# Patient Record
Sex: FEMALE | Race: WHITE | NOT HISPANIC OR LATINO | Employment: UNEMPLOYED | URBAN - METROPOLITAN AREA
[De-identification: names, ages, dates, MRNs, and addresses within clinical notes are randomized per-mention and may not be internally consistent; named-entity substitution may affect disease eponyms.]

---

## 2024-01-17 ENCOUNTER — OFFICE VISIT (OUTPATIENT)
Dept: OTOLARYNGOLOGY | Facility: CLINIC | Age: 39
End: 2024-01-17
Payer: COMMERCIAL

## 2024-01-17 VITALS — WEIGHT: 211 LBS | BODY MASS INDEX: 33.12 KG/M2 | HEIGHT: 67 IN

## 2024-01-17 DIAGNOSIS — J35.8 TONSILLOLITH: ICD-10-CM

## 2024-01-17 DIAGNOSIS — J35.01 CHRONIC TONSILLITIS: Primary | ICD-10-CM

## 2024-01-17 DIAGNOSIS — J35.1 TONSILLAR HYPERTROPHY: ICD-10-CM

## 2024-01-17 PROCEDURE — 99203 OFFICE O/P NEW LOW 30 MIN: CPT | Performed by: OTOLARYNGOLOGY

## 2024-01-17 PROCEDURE — 1036F TOBACCO NON-USER: CPT | Performed by: OTOLARYNGOLOGY

## 2024-01-17 RX ORDER — BISMUTH SUBSALICYLATE 262 MG
1 TABLET,CHEWABLE ORAL DAILY
COMMUNITY

## 2024-01-17 RX ORDER — ROSUVASTATIN CALCIUM 20 MG/1
20 TABLET, COATED ORAL DAILY
COMMUNITY

## 2024-01-17 RX ORDER — ESCITALOPRAM OXALATE 20 MG/1
20 TABLET ORAL DAILY
COMMUNITY

## 2024-01-17 RX ORDER — CLONAZEPAM 0.5 MG/1
0.5 TABLET ORAL 2 TIMES DAILY
COMMUNITY

## 2024-01-17 NOTE — PROGRESS NOTES
HPI  Deonna Saleem is a 38 y.o. female many year history of tonsil stones.  She has tried a variety of conservative methods including Waterpik, hydrogen peroxide gargle, manual disimpaction.  They continue to bother her with halitosis and pain and her efforts to remove them off and leave her with pain.  We discussed tonsillectomy about 6 years ago and she has considered this in the past but her symptoms have only gotten worse since that time.  She is otherwise healthy.  No bleeding or anesthesia problems.      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    Localized edema     Edema of leg    Other specified postprocedural states 12/06/2019    Status post LASIK surgery    Other vitreous opacities, unspecified eye 12/06/2019    Vitreous floaters    Personal history of other diseases of the nervous system and sense organs     History of sleep apnea    Personal history of other endocrine, nutritional and metabolic disease     History of diabetes mellitus    Personal history of other endocrine, nutritional and metabolic disease     History of obesity    Personal history of other endocrine, nutritional and metabolic disease     History of high cholesterol    Personal history of other endocrine, nutritional and metabolic disease     History of Hashimoto thyroiditis    Personal history of other mental and behavioral disorders     History of panic attacks    Personal history of other mental and behavioral disorders 11/17/2013    History of depression    Personal history of other mental and behavioral disorders     History of depression    Personal history of other specified conditions 08/08/2019    History of palpitations    Personal history of urinary calculi     History of kidney stones    Unspecified disorder of refraction 12/06/2019    Refractive error            Medications:     Current  "Outpatient Medications:     clonazePAM (KlonoPIN) 0.5 mg tablet, Take 1 tablet (0.5 mg) by mouth 2 times a day., Disp: , Rfl:     escitalopram (Lexapro) 20 mg tablet, Take 1 tablet (20 mg) by mouth once daily., Disp: , Rfl:     multivitamin tablet, Take 1 tablet by mouth once daily., Disp: , Rfl:     rosuvastatin (Crestor) 20 mg tablet, Take 1 tablet (20 mg) by mouth once daily., Disp: , Rfl:      Allergies:  Allergies   Allergen Reactions    Gadolinium-Containing Contrast Media Other        Physical Exam:  Last Recorded Vitals  Height 1.702 m (5' 7\"), weight 95.7 kg (211 lb).  General:     General appearance: Well-developed, well-nourished in no acute distress.       Voice:  normal       Head/face: Normal appearance; nontender to palpation     Facial nerve function: Normal and symmetric bilaterally.    Oral/oropharynx:     Oral vestibule: Normal labial and gingival mucosa     Tongue/floor of mouth: Normal without lesion     Oropharynx: Clear.  No lesions present of the hard/soft palate, posterior pharynx.  2+ tonsils, deep crypts    Neck:     Neck: Normal appearance, trachea midline     Salivary glands: Normal to palpation bilaterally     Lymph nodes: No cervical lymphadenopathy to palpation     Thyroid: No thyromegaly.  No palpable nodules     Range of motion: Normal    Neurological:     Cortical functions: Alert and oriented x3, appropriate affect       Larynx/hypopharynx:     Laryngeal findings: Mirror exam inadequate or limited secondary to enlarged base of tongue and/or excessive gagging    Ear:     Ear canal: Normal bilaterally     Tympanic membrane: Intact and mobile bilaterally     Pinna: Normal bilaterally     Hearing:  Gross hearing assessment normal by voice    Nose:     Visualized using: Anterior rhinoscopy     Nasopharynx: Inadequate mirror exam secondary to gag, anatomy.       Nasal dorsum: Nontraumatic midline appearance     Septum: Midline     Inferior turbinates: Normally sized     Mucosa: " Bilateral, pink, normal appearing       Assessment/Plan   Having failed conservative treatment, I have recommended tonsillectomy.  The risks, goals, and alternatives were discussed in detail including, but not limited to, general anesthesia, dehydration, bleeding, and infection.  Velopharyngeal insufficiency and regrowth of tissue potentially requiring revision procedure in the future were also reviewed.  Informed consent was indicated and the procedure will be scheduled.         Kwesi Hicks MD

## 2024-01-24 ENCOUNTER — APPOINTMENT (OUTPATIENT)
Dept: OTOLARYNGOLOGY | Facility: CLINIC | Age: 39
End: 2024-01-24
Payer: COMMERCIAL

## 2024-02-09 ENCOUNTER — APPOINTMENT (OUTPATIENT)
Dept: SURGERY | Facility: CLINIC | Age: 39
End: 2024-02-09
Payer: COMMERCIAL

## 2024-02-12 ENCOUNTER — APPOINTMENT (OUTPATIENT)
Dept: PHYSICAL THERAPY | Facility: CLINIC | Age: 39
End: 2024-02-12
Payer: COMMERCIAL

## 2024-02-13 ENCOUNTER — APPOINTMENT (OUTPATIENT)
Dept: OBSTETRICS AND GYNECOLOGY | Facility: CLINIC | Age: 39
End: 2024-02-13
Payer: COMMERCIAL

## 2024-02-21 ENCOUNTER — APPOINTMENT (OUTPATIENT)
Dept: OTOLARYNGOLOGY | Facility: CLINIC | Age: 39
End: 2024-02-21
Payer: COMMERCIAL

## 2024-02-23 ENCOUNTER — TELEPHONE (OUTPATIENT)
Dept: OTOLARYNGOLOGY | Facility: CLINIC | Age: 39
End: 2024-02-23
Payer: COMMERCIAL

## 2024-02-23 NOTE — TELEPHONE ENCOUNTER
Deonna is scheduled for tonsillectomy 4/11/2024. She also needs to schedule a hysterectomy and would like to schedule that procedure for 5/10/2024. They wanted check with you if you think that will be ok?

## 2024-03-07 ENCOUNTER — APPOINTMENT (OUTPATIENT)
Dept: PHYSICAL THERAPY | Facility: CLINIC | Age: 39
End: 2024-03-07
Payer: COMMERCIAL

## 2024-03-08 ENCOUNTER — TREATMENT (OUTPATIENT)
Dept: PHYSICAL THERAPY | Facility: CLINIC | Age: 39
End: 2024-03-08
Payer: COMMERCIAL

## 2024-03-08 DIAGNOSIS — R10.2 PELVIC PAIN: Primary | ICD-10-CM

## 2024-03-08 DIAGNOSIS — Z15.09 LYNCH SYNDROME: ICD-10-CM

## 2024-03-08 PROCEDURE — 97162 PT EVAL MOD COMPLEX 30 MIN: CPT | Mod: GP | Performed by: PHYSICAL THERAPIST

## 2024-03-08 PROCEDURE — 97112 NEUROMUSCULAR REEDUCATION: CPT | Mod: GP | Performed by: PHYSICAL THERAPIST

## 2024-03-08 PROCEDURE — 97530 THERAPEUTIC ACTIVITIES: CPT | Mod: GP | Performed by: PHYSICAL THERAPIST

## 2024-03-08 ASSESSMENT — PAIN SCALES - GENERAL: PAINLEVEL_OUTOF10: 6

## 2024-03-08 ASSESSMENT — PAIN - FUNCTIONAL ASSESSMENT: PAIN_FUNCTIONAL_ASSESSMENT: 0-10

## 2024-03-08 ASSESSMENT — PAIN DESCRIPTION - DESCRIPTORS: DESCRIPTORS: ACHING

## 2024-03-08 NOTE — PROGRESS NOTES
Physical Therapy Pelvic Floor Evaluation    Patient Name: Deonna Saleem  MRN: [id number]  Today's Date: 3/9/2024  Time Calculation  Start Time: 1020  Stop Time: 1135  Time Calculation (min): 75 min  PT Therapeutic Procedures Time Entry  Neuromuscular Re-Education Time Entry: 10  Therapeutic Activity Time Entry: 15     Moderate complexity due to patient's clinical presentation being evolving with changing characteristics, with comorbidities, all of which may negatively impact rehab tolerance and progression.    Current Problem:   1. 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  Has a lot of pain in groin and hip that has been going on for years. Tried therapy in the past a felt some relief.   Has tried PFPT in the past which helped.  Has recently seen dr about incomplete emptying of bowels and find she Keeps wiping.    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  Patient Primary Goal:   To reduce pain   PELVIC PAIN:     Location: left groin area   Description: constant 2-3 but can increase with standing and strenuous activity, worse at night 6-7/10 -- aching  Aggravating factors:ADLS and evening   Since onset, the symptoms are: unchanging (has had MRI's and numerous other injections to SIJ, hip without benefit)  Yes pain with intercourse    MENSTRUAL HISTORY:  Currently period is regular, really heavy  for a day then lighter    OB HISTORY:  1 child of 5 years old -- vaginal delivery -- no tearing    BLADDER:     No reports of bladder incontinence (stress or urgenecy), frequency    BOWEL:     Frequent constipation/straining/incomplete emptying: tends to be more constipated, takes benefiber and feels like she keeps wiping after bowel movement    FLUID/DIET INTAKE: 2L of water, benefiber, fiber one cereal    EXERCISE:  Current exercise regime:   Just started walking on treadmill    Objective   POSTURE/ALIGNMENT:  Increase in lumbar lordosis, slight thoracic scoliosis convex right, + right anterior rotation of innominate      ROM:  Lumbar ROM Range   Flexion 50%   Extension 50%   R sidebend 50%   L sidebend 50%     Hip AROM L R   Overall normal ROM grossly X X      MMT:  Hip MMT L R   Hip Flexion 4/5 4/5   Hip Extension 4/5 4/5   Hip Abduction 4-/5 4/5   Hip Adduction 4-/5 4/5   External Rotation 4-/5 4/5   Internal Rotation 4-/5 4/5      TA: fair transverse abdominis activation, poor load transfer with active SLR    FLEXIBILITY R/L:  Hamstrings: Tight/tight  DORINA: negative  FADIR: Negative    EXTERNAL MUSCLE PALPATION:  Iliopsoas: tender on left along with TFL on left   Adductor: tightness bilaterally     PELVIC FLOOR  Patient Position:  hooklying  EXTERNAL OBSERVATION:  Voluntary Contraction: able to contract and notice pucker of anus    Voluntary Relaxation: able to relax with proper cueing  Vulvar Assessment: healthy tissue noted    EXTERNAL PALPATION:  Levator Ani: no pain with    Bulbo: no pain with    Ischiocavernosus: no pain with    Transverse perineum: no pain with    Tender also at pubis     INTERNAL VAGINAL EXAMINATION WITH PATIENT CONSENT:  Patient position:  Hooklying   Internal:  Superficial layer mm   Vaginal opening slight tighter on right    Deep layer:  Tenderness at obturator internus bilateral, iliococcygeus and pubococcygeus Bilateral    Strength  3/5, endurance 4 sec     Prolapse  No noted  prolapse    Outcome Measures:  PFIQ-7:  22    Treatments:  Therapeutic activity:    Discussed elimination position with squatty potty and use of hip abd/add to increase tightness of anal sphincter to assist in elimination of excessive wiping  Neuromuscular re-education:    Educated on breathing and importance of diaphragmatic 360 breathing in relation to pelvic floor   Discussed kegel with coordinated breathing (handouts issued)     OP EDUCATION:  Outpatient Education  Individual(s) Educated: Patient  Education Provided: Anatomy, Home Exercise Program, POC, Physiology  Risk and Benefits Discussed with Patient/Caregiver/Other: yes  Patient/Caregiver Demonstrated Understanding: yes  Plan of Care Discussed and Agreed Upon: yes  Patient Response to Education: Patient/Caregiver Verbalized Understanding of Information, Patient/Caregiver Performed Return Demonstration of Exercises/Activities, Patient/Caregiver Asked Appropriate Questions    Assessment   PT Assessment Results: Decreased strength, Decreased range of motion, Impaired balance, Decreased mobility, Decreased coordination, Pain  Rehab Prognosis: Good  Evaluation/Treatment Tolerance: Patient tolerated treatment well    Pt is a 38 y.o. female who presents with impairments of weakness, tightness and pain. These impairments have led to functional limitations including difficulty with sleeping, increase wiping after bowel movement and pain with intercourse. Pt would benefit from skilled physical therapy intervention to improve above impairments and facilitate return to function.    Plan:  Treatment/Interventions: Aquatic therapy, Dry needling, Education/ Instruction, Electrical stimulation, Manual therapy, Therapeutic exercises, Therapeutic activities, Ultrasound, Taping techniques, Neuromuscular re-education  PT Plan: Skilled PT  PT Frequency: 1 time per week  Duration: 10 sessions in 12 weeks (patient having hysterectomy in 8 weeks... paitent will be scheduled up  until surgery)  Number of Treatments Authorized: 60 PT/OT/ST  Rehab Potential: Good  Plan of Care Agreement: Patient  Next session:  continued with stretching and strengthening   Goals:  Active       PT Problem       STGs 5 sessions       Start:  03/08/24    Expected End:  04/23/24       1)  Patient will be knowledgeable with regards to downtraining techniques to improve relaxation of pelvic floor muscles  2)  Patient will report 25% less pain/symptoms during ADLs  3)  Patient will perform diaphragmatic breathing properly to relax and contract pelvic floor muscle appropriately  4)  Patient will demonstrate good transverse abdominis activation to stabilize lumbopelvic girdle during ADLs                        LTGs -- 10 sessions       Start:  03/08/24    Expected End:  06/06/24       1)  Patient will increase tolerance to internal penetration for examination with min to nil pain  2)  Patient will be able to return to prior level of function with 75% less pain in evening  3)  Patient will have = pelvic alignment to improve posture of lumbar/sacral spine  4)  Patient will improve pelvic floor contraction to by 1/2-1 MMT  with coordinated exhale for improved continence   5)  Patient will increase LE strength by 1/2-1 MMT to support and stabilize lumbopelvic region         Patient Stated Goal 1       Start:  03/08/24    Expected End:  06/06/24       Patient wants to lessen pain and prevent problems for upcoming hysterectomy            Above Evaluation sent to Francheska Kenyon MD at [facility] via facility fax.

## 2024-03-08 NOTE — Clinical Note
March 9, 2024      No Recipients    Patient: Deonna Saleem   YOB: 1985   Date of Visit: 3/8/2024       Dear No referring provider defined for this encounter.    The attached plan of care is being sent to you because your patient’s medical reimbursement requires that you certify the plan of care. Your signature is required to allow uninterrupted insurance coverage.      You may indicate your approval by signing below and faxing this form back to us at No information on file..    Please call No information on file. with any questions or concerns.    Thank you for this referral,        Domenica Olson, PT  [facility] [address] [place] [address]    Payer: Payor: GAYE / Plan: ANTHEM [employer]MP / Product Type: *No Product type* /                                                                         Date:     Dear Domenica Olson PT,     Re: Ms. Deonna Saleem, MRN:[id number]    I certify that I have reviewed the attached plan of care and it is medically necessary for Ms. Deonna Saleem (1985) who is under my care.          ______________________________________                    _________________  Provider name and credentials                                           Date and time                                                                                      The following plan is in draft form.  Please refer to the current version for the most up-to-date information.            Plan of Care 3/8/24   Effective from: 3/8/2024  Effective to: 6/6/2024    Draft  Plan ID: [id number]           Participants as of 3/9/2024    Name Type Comments Contact Info    Domenica Olson PT Physical Therapist  [phone]      Last Plan Note     Author: Domenica Olson PT Status: Sign when Signing Visit Last edited: 3/8/2024 10:15 AM           Physical Therapy Pelvic Floor Evaluation    Patient Name: Deonna aSleem  MRN: [id number]  Today's Date: 3/9/2024  Time  Calculation  Start Time: 1020  Stop Time: 1135  Time Calculation (min): 75 min  PT Therapeutic Procedures Time Entry  Neuromuscular Re-Education Time Entry: 10  Therapeutic Activity Time Entry: 15     Moderate complexity due to patient's clinical presentation being evolving with changing characteristics, with comorbidities, all of which may negatively impact rehab tolerance and progression.    Current Problem:   1. 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  Has a lot of pain in groin and hip that has been going on for years. Tried therapy in the past a felt some relief.   Has tried PFPT in the past which helped.  Has recently seen dr about incomplete emptying of bowels and find she Keeps wiping.    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  Patient Primary Goal:   To reduce pain   PELVIC PAIN:     Location: left groin area   Description: constant 2-3 but can increase with standing and strenuous activity, worse at night 6-7/10 -- aching  Aggravating factors:ADLS and evening   Since onset, the symptoms are: unchanging (has had MRI's and numerous other injections to SIJ, hip without benefit)  Yes pain with intercourse    MENSTRUAL HISTORY:  Currently period is regular, really heavy for a day then lighter    OB HISTORY:  1 child of 5 years old -- vaginal delivery -- no tearing    BLADDER:     No  reports of bladder incontinence (stress or urgenecy), frequency    BOWEL:     Frequent constipation/straining/incomplete emptying: tends to be more constipated, takes benefiber and feels like she keeps wiping after bowel movement    FLUID/DIET INTAKE: 2L of water, benefiber, fiber one cereal    EXERCISE:  Current exercise regime:   Just started walking on treadmill    Objective   POSTURE/ALIGNMENT:  Increase in lumbar lordosis, slight thoracic scoliosis convex right, + right anterior rotation of innominate      ROM:  Lumbar ROM Range   Flexion 50%   Extension 50%   R sidebend 50%   L sidebend 50%     Hip AROM L R   Overall normal ROM grossly X X      MMT:  Hip MMT L R   Hip Flexion 4/5 4/5   Hip Extension 4/5 4/5   Hip Abduction 4-/5 4/5   Hip Adduction 4-/5 4/5   External Rotation 4-/5 4/5   Internal Rotation 4-/5 4/5      TA: fair transverse abdominis activation, poor load transfer with active SLR    FLEXIBILITY R/L:  Hamstrings: Tight/tight  DORINA: negative  FADIR: Negative    EXTERNAL MUSCLE PALPATION:  Iliopsoas: tender on left along with TFL on left   Adductor: tightness bilaterally     PELVIC FLOOR  Patient Position:  hooklying  EXTERNAL OBSERVATION:  Voluntary Contraction: able to contract and notice pucker of anus    Voluntary Relaxation: able to relax with proper cueing  Vulvar Assessment: healthy tissue noted    EXTERNAL PALPATION:  Levator Ani: no pain with    Bulbo: no pain with    Ischiocavernosus: no pain with    Transverse perineum: no pain with    Tender also at pubis     INTERNAL VAGINAL EXAMINATION WITH PATIENT CONSENT:  Patient position:  Hooklying   Internal:  Superficial layer mm   Vaginal opening slight tighter on right    Deep layer:  Tenderness at obturator internus bilateral, iliococcygeus and pubococcygeus Bilateral    Strength  3/5, endurance 4 sec     Prolapse  No noted prolapse    Outcome Measures:  PFIQ-7:  22    Treatments:  Therapeutic activity:    Discussed elimination position with  squatty potty and use of hip abd/add to increase tightness of anal sphincter to assist in elimination of excessive wiping  Neuromuscular re-education:    Educated on breathing and importance of diaphragmatic 360 breathing in relation to pelvic floor   Discussed sharonda with coordinated breathing (handouts issued)     OP EDUCATION:  Outpatient Education  Individual(s) Educated: Patient  Education Provided: Anatomy, Home Exercise Program, POC, Physiology  Risk and Benefits Discussed with Patient/Caregiver/Other: yes  Patient/Caregiver Demonstrated Understanding: yes  Plan of Care Discussed and Agreed Upon: yes  Patient Response to Education: Patient/Caregiver Verbalized Understanding of Information, Patient/Caregiver Performed Return Demonstration of Exercises/Activities, Patient/Caregiver Asked Appropriate Questions    Assessment   PT Assessment Results: Decreased strength, Decreased range of motion, Impaired balance, Decreased mobility, Decreased coordination, Pain  Rehab Prognosis: Good  Evaluation/Treatment Tolerance: Patient tolerated treatment well    Pt is a 38 y.o. female who presents with impairments of weakness, tightness and pain. These impairments have led to functional limitations including difficulty with sleeping, increase wiping after bowel movement and pain with intercourse. Pt would benefit from skilled physical therapy intervention to improve above impairments and facilitate return to function.    Plan:  Treatment/Interventions: Aquatic therapy, Dry needling, Education/ Instruction, Electrical stimulation, Manual therapy, Therapeutic exercises, Therapeutic activities, Ultrasound, Taping techniques, Neuromuscular re-education  PT Plan: Skilled PT  PT Frequency: 1 time per week  Duration: 10 sessions in 12 weeks (patient having hysterectomy in 8 weeks... paitent will be scheduled up until surgery)  Number of Treatments Authorized: 60 PT/OT/ST  Rehab Potential: Good  Plan of Care Agreement: Patient  Next  session:  continued with stretching and strengthening   Goals:  Active       PT Problem       STGs 5 sessions       Start:  03/08/24    Expected End:  04/23/24       1)  Patient will be knowledgeable with regards to downtraining techniques to improve relaxation of pelvic floor muscles  2)  Patient will report 25% less pain/symptoms during ADLs  3)  Patient will perform diaphragmatic breathing properly to relax and contract pelvic floor muscle appropriately  4)  Patient will demonstrate good transverse abdominis activation to stabilize lumbopelvic girdle during ADLs                        LTGs -- 10 sessions       Start:  03/08/24    Expected End:  06/06/24       1)  Patient will increase tolerance to internal penetration for examination with min to nil pain  2)  Patient will be able to return to prior level of function with 75% less pain in evening  3)  Patient will have = pelvic alignment to improve posture of lumbar/sacral spine  4)  Patient will improve pelvic floor contraction to by 1/2-1 MMT  with coordinated exhale for improved continence   5)  Patient will increase LE strength by 1/2-1 MMT to support and stabilize lumbopelvic region         Patient Stated Goal 1       Start:  03/08/24    Expected End:  06/06/24       Patient wants to lessen pain and prevent problems for upcoming hysterectomy

## 2024-03-09 PROBLEM — Z15.09 LYNCH SYNDROME: Status: ACTIVE | Noted: 2024-03-09

## 2024-03-09 PROBLEM — R10.2 PELVIC PAIN: Status: ACTIVE | Noted: 2024-03-09

## 2024-03-13 ENCOUNTER — APPOINTMENT (OUTPATIENT)
Dept: PHYSICAL THERAPY | Facility: CLINIC | Age: 39
End: 2024-03-13
Payer: COMMERCIAL

## 2024-03-19 ENCOUNTER — TREATMENT (OUTPATIENT)
Dept: PHYSICAL THERAPY | Facility: CLINIC | Age: 39
End: 2024-03-19
Payer: COMMERCIAL

## 2024-03-19 DIAGNOSIS — R10.2 PELVIC PAIN: ICD-10-CM

## 2024-03-19 DIAGNOSIS — Z15.09 LYNCH SYNDROME: ICD-10-CM

## 2024-03-19 PROCEDURE — 97140 MANUAL THERAPY 1/> REGIONS: CPT | Mod: GP | Performed by: PHYSICAL THERAPIST

## 2024-03-19 PROCEDURE — 97110 THERAPEUTIC EXERCISES: CPT | Mod: GP | Performed by: PHYSICAL THERAPIST

## 2024-03-19 ASSESSMENT — PAIN SCALES - GENERAL: PAINLEVEL_OUTOF10: 2

## 2024-03-19 ASSESSMENT — PAIN - FUNCTIONAL ASSESSMENT: PAIN_FUNCTIONAL_ASSESSMENT: 0-10

## 2024-03-19 NOTE — PROGRESS NOTES
Physical Therapy Treatment    Patient Name: Deonna Saleem  MRN: [id number]  Encounter date:  3/19/2024  Time Calculation  Start Time: 1630  Stop Time: 1730  Time Calculation (min): 60 min     PT Therapeutic Procedures Time Entry  Manual Therapy Time Entry: 15  Therapeutic Exercise Time Entry: 38    Visit Number:  2 (including evaluation)  Planned total visits: 10 per POC  Visits Authorized/Insurance Coverage:  $4000 DED-NET MET, 80/20 COVERAGE, 60V PT/OT/ST, NO AUTH     Current Problem  1. 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 Kenyon  Past Medical History Relevant to Rehab: Fibromyalgia, Williamson Syndrome     Feels like the exercises on toilet improves amount of wiping.      Objective  Right anterior rotation and left upslip     Treatment:  Manual:    Shotgun x 5  Long leg distraction pull with IR x 3   Isometric hip flexion left 5 sec x 5  Therapeutic exercise:    Educated on way to perform self MET at home  With hip adduction x 5   Leg lengthener x 3  Hip flexion left with right hip extension x 3  Hip abduction blue x 10   Clamshell x 10   Bridging x 10   TA activation x 10  Frog pose with red ball x 10 sec x 5   Modified happy baby (no ball) 10 sec x 3   Small LTR with red ball x 10   Small LTR without ball x 10      Current HEP:  Breathing   Access Code: [email or web address]  URL: [email or web address]  Date: 2024  Prepared by: Domenica Olson    Exercises  - Supine Hip adduction isometric with Ball -- Exercise 1   - 1 x daily - 7 x weekly - 1 sets - 5 reps - 3 hold  - Hooklying SI Joint Self-Correction  - 1 x daily - 7 x weekly - 1 sets - 5 reps - 3 hold  - Hooklying Clamshell with Resistance -- Exercise 4    - 1 x daily - 7 x weekly - 1 sets - 10 reps  - Clamshell  - 1 x daily - 7 x weekly - 1 sets - 10 reps  - Hooklying Transversus Abdominis Palpation  - 1 x daily - 7 x weekly - 1 sets - 10 reps - 3 hold  - Supine Pelvic Floor Stretch  - 1 x daily - 7 x weekly - 1 sets - 5 reps - 10 sec hold  - Supine Bridge  - 1 x daily - 7 x weekly - 1 sets - 10 reps  - Lower Trunk Rotations  - 1 x daily - 7 x weekly - 1 sets - 10 reps    OP EDUCATION:  Outpatient Education  Individual(s) Educated: Patient  Education Provided: Anatomy, Home Exercise Program, POC, Physiology  Risk and Benefits Discussed with Patient/Caregiver/Other: yes  Patient/Caregiver Demonstrated Understanding: yes  Plan of Care Discussed and Agreed Upon: yes  Patient Response to Education: Patient/Caregiver Verbalized Understanding of Information, Patient/Caregiver Performed Return Demonstration of Exercises/Activities, Patient/Caregiver Asked Appropriate Questions  Updated HEP, patient wanting to know more details about upcoming surgery and patient was informed to discuss procedure details with surgeon.    Assessment:  PT Assessment  PT Assessment Results: Decreased strength, Decreased range of motion, Impaired balance, Decreased mobility, Decreased coordination, Pain  Rehab Prognosis: Good  Evaluation/Treatment Tolerance: Patient tolerated treatment well  Pt's response to treatment:  Improved alignment after MET.  Good activation of transverse abdominis.  Patient continues to benefit from skilled PT to strengthen and progress towards established physical therapy goals.     Pain end of session: 1    Plan:  OP PT Plan  Treatment/Interventions: Aquatic therapy, Dry needling, Education/ Instruction, Electrical stimulation, Manual therapy, Therapeutic exercises, Therapeutic activities, Ultrasound, Taping techniques, Neuromuscular re-education  PT Plan: Skilled PT  PT Frequency: 1 time per week  Duration: 10 sessions in 12 weeks (patient having hysterectomy in 8  weeks... patient will be scheduled up until surgery)  Number of Treatments Authorized: 60 PT/OT/ST  Rehab Potential: Good  Plan of Care Agreement: Patient  Continue with current POC/no changes    Assessment of current progress against goals:  Progressing toward functional goals    Goals:  Active       PT Problem       STGs 5 sessions       Start:  03/08/24    Expected End:  04/23/24       1)  Patient will be knowledgeable with regards to downtraining techniques to improve relaxation of pelvic floor muscles  2)  Patient will report 25% less pain/symptoms during ADLs  3)  Patient will perform diaphragmatic breathing properly to relax and contract pelvic floor muscle appropriately  4)  Patient will demonstrate good transverse abdominis activation to stabilize lumbopelvic girdle during ADLs                        LTGs -- 10 sessions       Start:  03/08/24    Expected End:  06/06/24       1)  Patient will increase tolerance to internal penetration for examination with min to nil pain  2)  Patient will be able to return to prior level of function with 75% less pain in evening  3)  Patient will have = pelvic alignment to improve posture of lumbar/sacral spine  4)  Patient will improve pelvic floor contraction to by 1/2-1 MMT  with coordinated exhale for improved continence   5)  Patient will increase LE strength by 1/2-1 MMT to support and stabilize lumbopelvic region         Patient Stated Goal 1       Start:  03/08/24    Expected End:  06/06/24       Patient wants to lessen pain and prevent problems for upcoming hysterectomy

## 2024-03-26 ENCOUNTER — TREATMENT (OUTPATIENT)
Dept: PHYSICAL THERAPY | Facility: CLINIC | Age: 39
End: 2024-03-26
Payer: COMMERCIAL

## 2024-03-26 DIAGNOSIS — R10.2 PELVIC PAIN: ICD-10-CM

## 2024-03-26 DIAGNOSIS — Z15.09 LYNCH SYNDROME: ICD-10-CM

## 2024-03-26 PROCEDURE — 97110 THERAPEUTIC EXERCISES: CPT | Mod: GP | Performed by: PHYSICAL THERAPIST

## 2024-03-26 PROCEDURE — 97140 MANUAL THERAPY 1/> REGIONS: CPT | Mod: GP | Performed by: PHYSICAL THERAPIST

## 2024-03-26 ASSESSMENT — PAIN - FUNCTIONAL ASSESSMENT: PAIN_FUNCTIONAL_ASSESSMENT: 0-10

## 2024-03-26 ASSESSMENT — PAIN SCALES - GENERAL: PAINLEVEL_OUTOF10: 3

## 2024-03-26 NOTE — PROGRESS NOTES
Physical Therapy Treatment    Patient Name: Deonna Saleem  MRN: [id number]  Encounter date:  3/26/2024  Time Calculation  Start Time: 1115  Stop Time: 1210  Time Calculation (min): 55 min     PT Therapeutic Procedures Time Entry  Manual Therapy Time Entry: 15  Therapeutic Exercise Time Entry: 40    Visit Number:  3 (including evaluation)  Planned total visits: 10 per POC  Visits Authorized/Insurance Coverage:  $4000 DED-NET MET, 80/20 COVERAGE, 60V PT/OT/ST, NO AUTH     Current Problem  1. 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 Kenyon  Past Medical History Relevant to Rehab: Fibromyalgia, Williamson Syndrome     Feels she continues to wipe.  Patient had improved since beginning the exercises but feels she has plateaued.  Leaving with DC after session.  Admits to not performing exercises daily -- maybe 3 times a week.        Objective  Cues for breathing needed during session;    Brought son to department   Treatment:  Manual:    Shotgun x 5  Long leg distraction pull with IR x 3   Isometric hip flexion left 5 sec x 5  Therapeutic exercise:    Hip abduction red symmetry loop x 10   Roll for control ball and red loop x 10   Clamshell x 10   Bridging x 10   TA activation x 10  Heel raises x 10   Sideways ambulation red symmetry loop x 6' x 6   Forward raise x 10   Walk outs with push pull x (1 walk out x 10 push/pulls)   Lat pull down 10# symmetry cord x 10   Row 10# symmetry cord x 10   Sitting hamstring stretch 10 sec x 3  Piriformis stretch sitting push/pull 10 sec x 3     All exercises above with exhale on effort and krysta TA  Current HEP:  Breathing   Access Code: [email or web address]  URL: [email or web address]  Date: 2024  Prepared by: Domenica Olson      Exercises  - Supine Hip adduction isometric with Ball -- Exercise 1   - 1 x daily - 7 x weekly - 1 sets - 5 reps - 3 hold  - Hooklying SI Joint Self-Correction  - 1 x daily - 7 x weekly - 1 sets - 5 reps - 3 hold  - Hooklying Clamshell with Resistance -- Exercise 4   - 1 x daily - 7 x weekly - 1 sets - 10 reps  - Clamshell  - 1 x daily - 7 x weekly - 1 sets - 10 reps  - Hooklying Transversus Abdominis Palpation  - 1 x daily - 7 x weekly - 1 sets - 10 reps - 3 hold  - Supine Pelvic Floor Stretch  - 1 x daily - 7 x weekly - 1 sets - 5 reps - 10 sec hold  - Supine Bridge  - 1 x daily - 7 x weekly - 1 sets - 10 reps  - Lower Trunk Rotations  - 1 x daily - 7 x weekly - 1 sets - 10 reps     OP EDUCATION:  Outpatient Education  Individual(s) Educated: Patient  Education Provided: Anatomy, Home Exercise Program, POC, Physiology  Risk and Benefits Discussed with Patient/Caregiver/Other: yes  Patient/Caregiver Demonstrated Understanding: yes  Plan of Care Discussed and Agreed Upon: yes  Patient Response to Education: Patient/Caregiver Verbalized Understanding of Information, Patient/Caregiver Performed Return Demonstration of Exercises/Activities, Patient/Caregiver Asked Appropriate Questions    Assessment:  PT Assessment  PT Assessment Results: Decreased strength, Decreased range of motion, Impaired balance, Decreased mobility, Decreased coordination, Pain  Rehab Prognosis: Good  Pt's response to treatment:  Encouraged performance of HEP to improve pelvic floor strength and function.  Discussed exercises in standing to engage pelvic floor different.  Patient continues to benefit from skilled PT to improve  pelvic floor strength and progress towards established physical therapy goals.     Pain end of session: 2/10    Plan:  OP PT Plan  Treatment/Interventions: Aquatic therapy, Dry needling, Education/ Instruction, Electrical stimulation, Manual therapy, Therapeutic exercises, Therapeutic activities, Ultrasound, Taping  techniques, Neuromuscular re-education  PT Plan: Skilled PT  PT Frequency: 1 time per week  Duration: 10 sessions in 12 weeks (patient having hysterectomy in 8 weeks... patient will be scheduled up until surgery)  Number of Treatments Authorized: 60 PT/OT/ST  Rehab Potential: Good  Plan of Care Agreement: Patient  Continue with current POC/no changes    Assessment of current progress against goals:  Progressing toward functional goals    Goals:  Active       PT Problem       STGs 5 sessions       Start:  03/08/24    Expected End:  04/23/24       1)  Patient will be knowledgeable with regards to downtraining techniques to improve relaxation of pelvic floor muscles  2)  Patient will report 25% less pain/symptoms during ADLs  3)  Patient will perform diaphragmatic breathing properly to relax and contract pelvic floor muscle appropriately  4)  Patient will demonstrate good transverse abdominis activation to stabilize lumbopelvic girdle during ADLs                        LTGs -- 10 sessions       Start:  03/08/24    Expected End:  06/06/24       1)  Patient will increase tolerance to internal penetration for examination with min to nil pain  2)  Patient will be able to return to prior level of function with 75% less pain in evening  3)  Patient will have = pelvic alignment to improve posture of lumbar/sacral spine  4)  Patient will improve pelvic floor contraction to by 1/2-1 MMT  with coordinated exhale for improved continence   5)  Patient will increase LE strength by 1/2-1 MMT to support and stabilize lumbopelvic region         Patient Stated Goal 1       Start:  03/08/24    Expected End:  06/06/24       Patient wants to lessen pain and prevent problems for upcoming hysterectomy

## 2024-04-02 ENCOUNTER — TREATMENT (OUTPATIENT)
Dept: PHYSICAL THERAPY | Facility: CLINIC | Age: 39
End: 2024-04-02
Payer: COMMERCIAL

## 2024-04-02 DIAGNOSIS — Z15.09 LYNCH SYNDROME: ICD-10-CM

## 2024-04-02 DIAGNOSIS — R10.2 PELVIC PAIN: ICD-10-CM

## 2024-04-02 PROCEDURE — 97140 MANUAL THERAPY 1/> REGIONS: CPT | Mod: GP | Performed by: PHYSICAL THERAPIST

## 2024-04-02 PROCEDURE — 97110 THERAPEUTIC EXERCISES: CPT | Mod: GP | Performed by: PHYSICAL THERAPIST

## 2024-04-02 ASSESSMENT — PAIN - FUNCTIONAL ASSESSMENT: PAIN_FUNCTIONAL_ASSESSMENT: 0-10

## 2024-04-02 ASSESSMENT — PAIN SCALES - GENERAL: PAINLEVEL_OUTOF10: 2

## 2024-04-02 NOTE — PROGRESS NOTES
Physical Therapy Treatment    Patient Name: Deonna Saleem  MRN: [id number]  Encounter date:  2024  Time Calculation  Start Time: 1130  Stop Time: 1230  Time Calculation (min): 60 min     PT Therapeutic Procedures Time Entry  Manual Therapy Time Entry: 30  Therapeutic Exercise Time Entry: 25    Visit Number:  4 (including evaluation)  Planned total visits: 10 per POC  Visits Authorized/Insurance Coverage:  $4000 DED-NET MET, 80/20 COVERAGE, 60V PT/OT/ST, NO AUTH     Current Problem  1. 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 Kenyon  Past Medical History Relevant to Rehab: Fibromyalgia, Williamson Syndrome     Was sore on  because of walking so much at DC on Saturday but has recovered well.      Objective  Tender left gluteal medius, tender right pubococcygeus, = pelvic alignment    Treatment:  Therapeutic exercise:    Hip abduction red symmetry loop x 10   Roll for control with ball squeeze x 10   Bridging x 10   TA activation x 10  TA dead bug x 10      Manual:    STM and trigger point release to bilateral pelvic floor muscles.    Verbal informed consent post discussion of risks and benefits.    Palpation to reveal tender areas.  Safety zones assessed.    Dry needling performed with pt positioned right sidelying    40mm needles inserted in left gluteal medius medial and left gluteal medius medial  30mm gluteal edward 2 x ,   Needles left in situ for 3 minutes.  Needles wound/pistoned prior to removal.  Universal and standard precautions maintained.  No adverse reaction noted post needle removal.     Instructed on use of muscle roller stick to reduce fascial tightness    Current HEP:  Breathing   Access Code: [email or web address]  URL: [email or web address]  Date: 2024  Prepared  by: Domenica Olson     Exercises  - Supine Hip adduction isometric with Ball -- Exercise 1   - 1 x daily - 7 x weekly - 1 sets - 5 reps - 3 hold  - Hooklying SI Joint Self-Correction  - 1 x daily - 7 x weekly - 1 sets - 5 reps - 3 hold  - Hooklying Clamshell with Resistance -- Exercise 4   - 1 x daily - 7 x weekly - 1 sets - 10 reps  - Clamshell  - 1 x daily - 7 x weekly - 1 sets - 10 reps  - Hooklying Transversus Abdominis Palpation  - 1 x daily - 7 x weekly - 1 sets - 10 reps - 3 hold  - Supine Pelvic Floor Stretch  - 1 x daily - 7 x weekly - 1 sets - 5 reps - 10 sec hold  - Supine Bridge  - 1 x daily - 7 x weekly - 1 sets - 10 reps  - Lower Trunk Rotations  - 1 x daily - 7 x weekly - 1 sets - 10 reps      OP EDUCATION:  Outpatient Education  Individual(s) Educated: Patient  Education Provided: Anatomy, Home Exercise Program, POC, Physiology  Risk and Benefits Discussed with Patient/Caregiver/Other: yes  Patient/Caregiver Demonstrated Understanding: yes  Plan of Care Discussed and Agreed Upon: yes  Patient Response to Education: Patient/Caregiver Verbalized Understanding of Information, Patient/Caregiver Performed Return Demonstration of Exercises/Activities, Patient/Caregiver Asked Appropriate Questions    Assessment:  PT Assessment  PT Assessment Results: Decreased strength, Decreased range of motion, Impaired balance, Decreased mobility, Decreased coordination, Pain  Rehab Prognosis: Good  Evaluation/Treatment Tolerance: Patient tolerated treatment well  Pt's response to treatment:  Initiated dry needling to reduce pain in gluteal region.  Patient felt less pain post session.  Able to improve pelvic floor contraction with exhaling.  Patient continues to benefit from skilled PT to strengthen and progress towards established physical therapy goals.     Pain end of session    Plan:  OP PT Plan  Treatment/Interventions: Aquatic therapy, Dry needling, Education/ Instruction, Electrical stimulation, Manual therapy,  Therapeutic exercises, Therapeutic activities, Ultrasound, Taping techniques, Neuromuscular re-education  PT Plan: Skilled PT  PT Frequency: 1 time per week  Duration: 10 sessions in 12 weeks (patient having hysterectomy in 8 weeks... patient will be scheduled up until surgery)  Number of Treatments Authorized: 60 PT/OT/ST  Rehab Potential: Good  Plan of Care Agreement: Patient  Continue with current POC/no changes    Assessment of current progress against goals:  Progressing toward functional goals    Goals:  Active       PT Problem       STGs 5 sessions       Start:  03/08/24    Expected End:  04/23/24       1)  Patient will be knowledgeable with regards to downtraining techniques to improve relaxation of pelvic floor muscles  2)  Patient will report 25% less pain/symptoms during ADLs  3)  Patient will perform diaphragmatic breathing properly to relax and contract pelvic floor muscle appropriately  4)  Patient will demonstrate good transverse abdominis activation to stabilize lumbopelvic girdle during ADLs                        LTGs -- 10 sessions       Start:  03/08/24    Expected End:  06/06/24       1)  Patient will increase tolerance to internal penetration for examination with min to nil pain  2)  Patient will be able to return to prior level of function with 75% less pain in evening  3)  Patient will have = pelvic alignment to improve posture of lumbar/sacral spine  4)  Patient will improve pelvic floor contraction to by 1/2-1 MMT  with coordinated exhale for improved continence   5)  Patient will increase LE strength by 1/2-1 MMT to support and stabilize lumbopelvic region         Patient Stated Goal 1       Start:  03/08/24    Expected End:  06/06/24       Patient wants to lessen pain and prevent problems for upcoming hysterectomy

## 2024-04-10 ENCOUNTER — TREATMENT (OUTPATIENT)
Dept: PHYSICAL THERAPY | Facility: CLINIC | Age: 39
End: 2024-04-10
Payer: COMMERCIAL

## 2024-04-10 DIAGNOSIS — R10.2 PELVIC PAIN: ICD-10-CM

## 2024-04-10 DIAGNOSIS — Z15.09 LYNCH SYNDROME: ICD-10-CM

## 2024-04-10 PROCEDURE — 97110 THERAPEUTIC EXERCISES: CPT | Mod: GP | Performed by: PHYSICAL THERAPIST

## 2024-04-10 PROCEDURE — 97112 NEUROMUSCULAR REEDUCATION: CPT | Mod: GP | Performed by: PHYSICAL THERAPIST

## 2024-04-10 ASSESSMENT — PAIN - FUNCTIONAL ASSESSMENT: PAIN_FUNCTIONAL_ASSESSMENT: 0-10

## 2024-04-10 ASSESSMENT — PAIN SCALES - GENERAL: PAINLEVEL_OUTOF10: 5 - MODERATE PAIN

## 2024-04-10 ASSESSMENT — PAIN DESCRIPTION - DESCRIPTORS: DESCRIPTORS: SORE

## 2024-04-10 NOTE — PROGRESS NOTES
Physical Therapy Treatment    Patient Name: Deonna Saleem  MRN: [id number]  Encounter date:  4/10/2024  Time Calculation  Start Time: 1115  Stop Time: 1215  Time Calculation (min): 60 min     PT Therapeutic Procedures Time Entry  Neuromuscular Re-Education Time Entry: 20  Therapeutic Exercise Time Entry: 33    Visit Number:  5 (including evaluation)  Planned total visits: 10 per POC  Visits Authorized/Insurance Coverage:  $4000 DED-NET MET, 80/20 COVERAGE, 60V PT/OT/ST, NO AUTH     Current Problem  1. 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 Kenyon  Past Medical History Relevant to Rehab: Fibromyalgia, Williamson Syndrome     Left hip pain is acting up more today.  Felt a fire poker in left hip .  Actually feels all over more pain/soreness.  Had anomyometry done and said tightness in sphincter reason for excessive wiping.  Referred to pelvic floor therapy.      Objective  Able to relax with one nostril breathing.      Treatment:  Therapeutic exercise:    Modified happy baby with ball 10 sec x 5   Frog pose 10 sec x 5   Piriformis stretch 10 sec x 3   Kimmy pose 10 sec x 3  with sidebending bilateral 10 sec x 3   Cat/cow 10 sec x 3   Tail wagging in quadruped position x 10   Focused on how to breath during stretching to reduce tightness    Continued to encouraged strengthening for pelvic floor to improve outcome post surgery.  And stressed need to balance both stretching and strengthening.  Added to HEP heel raises and side stepping with band.  (From prior sessions)   Discussed band/loops to purchase as band issued in PT tends to roll and irritate legs.       Neuro Re-education:    Spent time discussing breathing work for anxiety; 478 breathing, box breathing, one nostril  breathing to increase focus on breath not on anxious feeling.  Patient also was educated on anxiety and impact on tightness.      Current HEP:  Access Code: [email or web address]  URL: [email or web address]  Date: 2024  Prepared by: Domenica Olson    Exercises  - Supine Piriformis Stretch with Swiss Ball  - 1 x daily - 7 x weekly - 1 sets - 5 reps - 10 sec hold  - Supine Knees to Chest with Swiss Ball  - 1 x daily - 7 x weekly - 1 sets - 5 reps - 10 sec hold  - Child's Pose Stretch  - 1 x daily - 7 x weekly - 1 sets - 5 reps - 10 sec hold  - Child's Pose with Sidebending  - 1 x daily - 7 x weekly - 1 sets - 5 reps - 10 sec  hold  - Standing Heel Raise  - 1 x daily - 7 x weekly - 1 sets - 10 reps  - Side Stepping with Resistance at Thighs  - 1 x daily - 7 x weekly - 1 sets - 10 reps  Breathing   Access Code: [email or web address]  URL: [email or web address]  Date: 2024  Prepared by: Domenica Olson     Exercises  - Supine Hip adduction isometric with Ball -- Exercise 1   - 1 x daily - 7 x weekly - 1 sets - 5 reps - 3 hold  - Hooklying SI Joint Self-Correction  - 1 x daily - 7 x weekly - 1 sets - 5 reps - 3 hold  - Hooklying Clamshell with Resistance -- Exercise 4   - 1 x daily - 7 x weekly - 1 sets - 10 reps  - Clamshell  - 1 x daily - 7 x weekly - 1 sets - 10 reps  - Hooklying Transversus Abdominis Palpation  - 1 x daily - 7 x weekly - 1 sets - 10 reps - 3 hold  - Supine Pelvic Floor Stretch  - 1 x daily - 7 x weekly - 1 sets - 5 reps - 10 sec hold  - Supine Bridge  - 1 x daily - 7 x weekly - 1 sets - 10 reps  - Lower Trunk Rotations  - 1 x daily - 7 x weekly - 1 sets - 10 reps     OP EDUCATION:  Outpatient Education  Individual(s) Educated: Patient  Education Provided: Anatomy, Home Exercise Program, POC, Physiology  Risk and Benefits Discussed with Patient/Caregiver/Other: yes  Patient/Caregiver Demonstrated Understanding: yes  Plan of Care Discussed and Agreed Upon: yes  Patient Response to Education:  Patient/Caregiver Verbalized Understanding of Information, Patient/Caregiver Performed Return Demonstration of Exercises/Activities, Patient/Caregiver Asked Appropriate Questions  HEP updated     Assessment:  PT Assessment  PT Assessment Results: Decreased strength, Decreased range of motion, Impaired balance, Decreased mobility, Decreased coordination, Pain  Rehab Prognosis: Good  Pt's response to treatment:  Patietn with less pain after therapy with stretching.  Anxiety may be impacting tightness of pelvic floor and sphincters.  Patient continues to benefit from skilled PT to continue to reduce tightness and progress towards established physical therapy goals.     Pain end of session: 3    Plan:  OP PT Plan  Treatment/Interventions: Aquatic therapy, Dry needling, Education/ Instruction, Electrical stimulation, Manual therapy, Therapeutic exercises, Therapeutic activities, Ultrasound, Taping techniques, Neuromuscular re-education  PT Plan: Skilled PT  PT Frequency: 1 time per week  Duration: 10 sessions in 12 weeks (patient having hysterectomy in 8 weeks... patient will be scheduled up until surgery)  Number of Treatments Authorized: 60 PT/OT/ST  Rehab Potential: Good  Plan of Care Agreement: Patient  Continue with current POC with the following changes continue with needling if needed    Assessment of current progress against goals:  Progressing toward functional goals and Symptomatic relief with treatment    Goals:  Active       PT Problem       STGs 5 sessions       Start:  03/08/24    Expected End:  04/23/24       1)  Patient will be knowledgeable with regards to downtraining techniques to improve relaxation of pelvic floor muscles  2)  Patient will report 25% less pain/symptoms during ADLs  3)  Patient will perform diaphragmatic breathing properly to relax and contract pelvic floor muscle appropriately  4)  Patient will demonstrate good transverse abdominis activation to stabilize lumbopelvic girdle during  ADLs                        LTGs -- 10 sessions       Start:  03/08/24    Expected End:  06/06/24       1)  Patient will increase tolerance to internal penetration for examination with min to nil pain  2)  Patient will be able to return to prior level of function with 75% less pain in evening  3)  Patient will have = pelvic alignment to improve posture of lumbar/sacral spine  4)  Patient will improve pelvic floor contraction to by 1/2-1 MMT  with coordinated exhale for improved continence   5)  Patient will increase LE strength by 1/2-1 MMT to support and stabilize lumbopelvic region         Patient Stated Goal 1       Start:  03/08/24    Expected End:  06/06/24       Patient wants to lessen pain and prevent problems for upcoming hysterectomy

## 2024-04-16 ENCOUNTER — TREATMENT (OUTPATIENT)
Dept: PHYSICAL THERAPY | Facility: CLINIC | Age: 39
End: 2024-04-16
Payer: COMMERCIAL

## 2024-04-16 DIAGNOSIS — R10.2 PELVIC PAIN: ICD-10-CM

## 2024-04-16 DIAGNOSIS — Z15.09 LYNCH SYNDROME: ICD-10-CM

## 2024-04-16 PROCEDURE — 97110 THERAPEUTIC EXERCISES: CPT | Mod: GP | Performed by: PHYSICAL THERAPIST

## 2024-04-16 PROCEDURE — 97140 MANUAL THERAPY 1/> REGIONS: CPT | Mod: GP | Performed by: PHYSICAL THERAPIST

## 2024-04-16 ASSESSMENT — PAIN SCALES - GENERAL: PAINLEVEL_OUTOF10: 5 - MODERATE PAIN

## 2024-04-16 ASSESSMENT — PAIN - FUNCTIONAL ASSESSMENT: PAIN_FUNCTIONAL_ASSESSMENT: 0-10

## 2024-04-16 ASSESSMENT — PAIN DESCRIPTION - DESCRIPTORS: DESCRIPTORS: SORE

## 2024-04-16 NOTE — PROGRESS NOTES
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 Kenyon  Past Medical History Relevant to Rehab: Fibromyalgia, Williamson Syndrome     Needing to change anxiety medication as prior one caused increase in BP and Heart Rate.  Patient reporting that jayden pose and cat/cow increase Pots symptoms.  (Patient was taught modifications below and was told to stop exercise if symptoms continue.  Wiping symptoms are better but not changed since initial evaluation.  Also had colonoscopy yesterday so     Objective  Tender at left quadratus lumborum proximal attachment    Treatment:  Therapeutic exercise:    No additional exercises issued to HEP  Discussed modifications to jayden pose -- trial in sitting the ball rolling and trial of sitting pushing back from counter with UE  Patient to continue with current HEP for stretching  Sidelying quadratus stretch   Manual:    STM to (L) quadratus lumborum  in right sidelying with deep prep     Current HEP:  Access Code: [email or web address]  URL: [email or web address]  Date: 04/11/2024  Prepared by: Domenica Olson     Exercises  - Supine Piriformis Stretch with Swiss Ball  - 1 x daily - 7 x weekly - 1 sets - 5 reps - 10  sec hold  - Supine Knees to Chest with Swiss Ball  - 1 x daily - 7 x weekly - 1 sets - 5 reps - 10 sec hold  - Child's Pose Stretch  - 1 x daily - 7 x weekly - 1 sets - 5 reps - 10 sec hold  - Child's Pose with Sidebending  - 1 x daily - 7 x weekly - 1 sets - 5 reps - 10 sec  hold  - Standing Heel Raise  - 1 x daily - 7 x weekly - 1 sets - 10 reps  - Side Stepping with Resistance at Thighs  - 1 x daily - 7 x weekly - 1 sets - 10 reps  Breathing   Access Code: [email or web address]  URL: [email or web address]  Date: 2024  Prepared by: Domenica Olson     Exercises  - Supine Hip adduction isometric with Ball -- Exercise 1   - 1 x daily - 7 x weekly - 1 sets - 5 reps - 3 hold  - Hooklying SI Joint Self-Correction  - 1 x daily - 7 x weekly - 1 sets - 5 reps - 3 hold  - Hooklying Clamshell with Resistance -- Exercise 4   - 1 x daily - 7 x weekly - 1 sets - 10 reps  - Clamshell  - 1 x daily - 7 x weekly - 1 sets - 10 reps  - Hooklying Transversus Abdominis Palpation  - 1 x daily - 7 x weekly - 1 sets - 10 reps - 3 hold  - Supine Pelvic Floor Stretch  - 1 x daily - 7 x weekly - 1 sets - 5 reps - 10 sec hold  - Supine Bridge  - 1 x daily - 7 x weekly - 1 sets - 10 reps  - Lower Trunk Rotations  - 1 x daily - 7 x weekly - 1 sets - 10 rep      OP EDUCATION:  Outpatient Education  Individual(s) Educated: Patient  Education Provided: Anatomy, Home Exercise Program, POC, Physiology  Risk and Benefits Discussed with Patient/Caregiver/Other: yes  Patient/Caregiver Demonstrated Understanding: yes  Plan of Care Discussed and Agreed Upon: yes  Patient Response to Education: Patient/Caregiver Verbalized Understanding of Information, Patient/Caregiver Performed Return Demonstration of Exercises/Activities, Patient/Caregiver Asked Appropriate Questions  Re-educated on breathing and anxiety reduction    Assessment:  PT Assessment  PT Assessment Results: Decreased strength, Decreased range of motion, Impaired balance, Decreased mobility,  Decreased coordination, Pain  Rehab Prognosis: Good  Pt's response to treatment:  Patient with less pain after session.  Anxiety may impacting tightness in back pelvic floor.  Patient continues to benefit from skilled PT to reduce tightness and progress towards established physical therapy goals.     Pain end of session: 3-4    Plan:  OP PT Plan  Treatment/Interventions: Aquatic therapy, Dry needling, Education/ Instruction, Electrical stimulation, Manual therapy, Therapeutic exercises, Therapeutic activities, Ultrasound, Taping techniques, Neuromuscular re-education  PT Plan: Skilled PT  PT Frequency: 1 time per week  Duration: 10 sessions in 12 weeks (patient having hysterectomy in 8 weeks... patient will be scheduled up until surgery)  Number of Treatments Authorized: 60 PT/OT/ST  Rehab Potential: Good  Plan of Care Agreement: Patient  Continue with current POC/no changes    Assessment of current progress against goals:  Progressing toward functional goals    Goals:  Active       PT Problem       STGs 5 sessions       Start:  03/08/24    Expected End:  04/23/24       1)  Patient will be knowledgeable with regards to downtraining techniques to improve relaxation of pelvic floor muscles  2)  Patient will report 25% less pain/symptoms during ADLs  3)  Patient will perform diaphragmatic breathing properly to relax and contract pelvic floor muscle appropriately  4)  Patient will demonstrate good transverse abdominis activation to stabilize lumbopelvic girdle during ADLs                        LTGs -- 10 sessions       Start:  03/08/24    Expected End:  06/06/24       1)  Patient will increase tolerance to internal penetration for examination with min to nil pain  2)  Patient will be able to return to prior level of function with 75% less pain in evening  3)  Patient will have = pelvic alignment to improve posture of lumbar/sacral spine  4)  Patient will improve pelvic floor contraction to by 1/2-1 MMT  with  coordinated exhale for improved continence   5)  Patient will increase LE strength by 1/2-1 MMT to support and stabilize lumbopelvic region         Patient Stated Goal 1       Start:  03/08/24    Expected End:  06/06/24       Patient wants to lessen pain and prevent problems for upcoming hysterectomy

## 2024-04-23 ENCOUNTER — TREATMENT (OUTPATIENT)
Dept: PHYSICAL THERAPY | Facility: CLINIC | Age: 39
End: 2024-04-23
Payer: COMMERCIAL

## 2024-04-23 DIAGNOSIS — R10.2 PELVIC PAIN: ICD-10-CM

## 2024-04-23 DIAGNOSIS — Z15.09 LYNCH SYNDROME: ICD-10-CM

## 2024-04-23 PROCEDURE — 97140 MANUAL THERAPY 1/> REGIONS: CPT | Mod: GP | Performed by: PHYSICAL THERAPIST

## 2024-04-23 PROCEDURE — 97112 NEUROMUSCULAR REEDUCATION: CPT | Mod: GP | Performed by: PHYSICAL THERAPIST

## 2024-04-23 NOTE — PROGRESS NOTES
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 Kenyon  Past Medical History Relevant to Rehab: Fibromyalgia, Williamson Syndrome     Feels like wiping has been better.  A little groin pain 3/10 and low back pain 3/10;  Patient met with surgeon  and procedure explained.      Objective  Tight gluteal medius;      Treatment:  Neuro Re-education:    Discussed pain science and how to reduce pain and anxiety tightness.  Patient reports she was referred to acupuncture.  PT discussed acupuncture vs dry needling and other holistic approaches to managing anxiety and overall tightness of muscles.    Manual:    Dry needling:    Verbal informed consent post discussion of risks and benefits.    Palpation to reveal tender areas.  Safety zones assessed.    Dry needling performed with pt positioned right sidelying   40mm needles inserted in gluteal medius medial and lateral,   30mm needles in proximal ITB  30mm gluteal edward  Needles left in situ for 5 minutes.  Needles wound/pistoned prior to removal.  Universal and standard precautions maintained.  No adverse reaction noted post needle removal.       STM to left gluteal  region with free up       Current HEP:  Access Code: [email or web address]  URL: [email or web address]  Date: 04/11/2024  Prepared by: Domenica  Whitney     Exercises  - Supine Piriformis Stretch with Swiss Ball  - 1 x daily - 7 x weekly - 1 sets - 5 reps - 10 sec hold  - Supine Knees to Chest with Swiss Ball  - 1 x daily - 7 x weekly - 1 sets - 5 reps - 10 sec hold  - Child's Pose Stretch  - 1 x daily - 7 x weekly - 1 sets - 5 reps - 10 sec hold  - Child's Pose with Sidebending  - 1 x daily - 7 x weekly - 1 sets - 5 reps - 10 sec  hold  - Standing Heel Raise  - 1 x daily - 7 x weekly - 1 sets - 10 reps  - Side Stepping with Resistance at Thighs  - 1 x daily - 7 x weekly - 1 sets - 10 reps  Breathing   Access Code: [email or web address]  URL: [email or web address]  Date: 2024  Prepared by: Domenica Olson     Exercises  - Supine Hip adduction isometric with Ball -- Exercise 1   - 1 x daily - 7 x weekly - 1 sets - 5 reps - 3 hold  - Hooklying SI Joint Self-Correction  - 1 x daily - 7 x weekly - 1 sets - 5 reps - 3 hold  - Hooklying Clamshell with Resistance -- Exercise 4   - 1 x daily - 7 x weekly - 1 sets - 10 reps  - Clamshell  - 1 x daily - 7 x weekly - 1 sets - 10 reps  - Hooklying Transversus Abdominis Palpation  - 1 x daily - 7 x weekly - 1 sets - 10 reps - 3 hold  - Supine Pelvic Floor Stretch  - 1 x daily - 7 x weekly - 1 sets - 5 reps - 10 sec hold  - Supine Bridge  - 1 x daily - 7 x weekly - 1 sets - 10 reps  - Lower Trunk Rotations  - 1 x daily - 7 x weekly - 1 sets - 10 rep      OP EDUCATION:  Outpatient Education  Individual(s) Educated: Patient  Education Provided: Anatomy, Home Exercise Program, POC, Physiology  Risk and Benefits Discussed with Patient/Caregiver/Other: yes  Patient/Caregiver Demonstrated Understanding: yes  Plan of Care Discussed and Agreed Upon: yes  Patient Response to Education: Patient/Caregiver Verbalized Understanding of Information, Patient/Caregiver Performed Return Demonstration of Exercises/Activities, Patient/Caregiver Asked Appropriate Questions    Assessment:  PT Assessment  PT Assessment Results: Decreased  strength, Decreased range of motion, Impaired balance, Decreased mobility, Decreased coordination, Pain  Rehab Prognosis: Good  Pt's response to treatment:  Able to reduce muscle tightness with dry needling and manual.  Patient may want to consider acupuncture and or reiki for additional relaxation approaches  2 more sessions until hysterectomy.  Patient continues to benefit from skilled PT to reduce tightness and progress towards established physical therapy goals.     Pain end of session: 1    Plan:  OP PT Plan  Treatment/Interventions: Aquatic therapy, Dry needling, Education/ Instruction, Electrical stimulation, Manual therapy, Therapeutic exercises, Therapeutic activities, Ultrasound, Taping techniques, Neuromuscular re-education  PT Plan: Skilled PT  PT Frequency: 1 time per week  Duration: 10 sessions in 12 weeks (patient having hysterectomy in 8 weeks... patient will be scheduled up until surgery)  Number of Treatments Authorized: 60 PT/OT/ST  Rehab Potential: Good  Plan of Care Agreement: Patient  Continue with current POC/no changes    Assessment of current progress against goals:  Progressing toward functional goals    Goals:  Active       PT Problem       STGs 5 sessions       Start:  03/08/24    Expected End:  04/23/24       1)  Patient will be knowledgeable with regards to downtraining techniques to improve relaxation of pelvic floor muscles  2)  Patient will report 25% less pain/symptoms during ADLs  3)  Patient will perform diaphragmatic breathing properly to relax and contract pelvic floor muscle appropriately  4)  Patient will demonstrate good transverse abdominis activation to stabilize lumbopelvic girdle during ADLs                        LTGs -- 10 sessions       Start:  03/08/24    Expected End:  06/06/24       1)  Patient will increase tolerance to internal penetration for examination with min to nil pain  2)  Patient will be able to return to prior level of function with 75% less pain in  evening  3)  Patient will have = pelvic alignment to improve posture of lumbar/sacral spine  4)  Patient will improve pelvic floor contraction to by 1/2-1 MMT  with coordinated exhale for improved continence   5)  Patient will increase LE strength by 1/2-1 MMT to support and stabilize lumbopelvic region         Patient Stated Goal 1       Start:  03/08/24    Expected End:  06/06/24       Patient wants to lessen pain and prevent problems for upcoming hysterectomy

## 2024-04-30 ENCOUNTER — APPOINTMENT (OUTPATIENT)
Dept: PHYSICAL THERAPY | Facility: CLINIC | Age: 39
End: 2024-04-30
Payer: COMMERCIAL

## 2024-05-07 ENCOUNTER — APPOINTMENT (OUTPATIENT)
Dept: PHYSICAL THERAPY | Facility: CLINIC | Age: 39
End: 2024-05-07
Payer: COMMERCIAL

## 2024-06-07 ENCOUNTER — DOCUMENTATION (OUTPATIENT)
Dept: PHYSICAL THERAPY | Facility: CLINIC | Age: 39
End: 2024-06-07
Payer: COMMERCIAL

## 2024-06-08 NOTE — PROGRESS NOTES
Physical Therapy    Discharge Summary    Name: Deonna Saleem  MRN: [id number]  : 1985  Date: 24    Discharge Summary: PT    Discharge Information: Date of discharge 24, Date of last visit 24, Date of evaluation 3/8/24, Number of attended visits 7, Referred by Dr. Ruano, and Referred for Williamson Syndrome    Therapy Summary: wiping of stool had improved.  With increase in anxiety from upcoming surgery (was scheduled 5/10/24.  Pain has been 3/10.      Discharge Status: Partially achieved all goals     Rehab Discharge Reason: Other discharge because of surgery

## 2024-06-20 DIAGNOSIS — G90.A POSTURAL ORTHOSTATIC TACHYCARDIA SYNDROME: Primary | ICD-10-CM

## 2024-06-21 ENCOUNTER — CLINICAL SUPPORT (OUTPATIENT)
Dept: CARDIAC REHAB | Facility: CLINIC | Age: 39
End: 2024-06-21
Payer: COMMERCIAL

## 2024-06-21 DIAGNOSIS — G90.A POSTURAL ORTHOSTATIC TACHYCARDIA SYNDROME: ICD-10-CM

## 2024-06-24 ENCOUNTER — CLINICAL SUPPORT (OUTPATIENT)
Dept: CARDIAC REHAB | Facility: CLINIC | Age: 39
End: 2024-06-24
Payer: COMMERCIAL

## 2024-06-24 ENCOUNTER — APPOINTMENT (OUTPATIENT)
Dept: CARDIAC REHAB | Facility: CLINIC | Age: 39
End: 2024-06-24
Payer: COMMERCIAL

## 2024-06-24 DIAGNOSIS — G90.A POSTURAL ORTHOSTATIC TACHYCARDIA SYNDROME: ICD-10-CM

## 2024-06-24 PROCEDURE — 93798 PHYS/QHP OP CAR RHAB W/ECG: CPT | Performed by: INTERNAL MEDICINE

## 2024-06-26 ENCOUNTER — CLINICAL SUPPORT (OUTPATIENT)
Dept: CARDIAC REHAB | Facility: CLINIC | Age: 39
End: 2024-06-26
Payer: COMMERCIAL

## 2024-06-26 DIAGNOSIS — G90.A POSTURAL ORTHOSTATIC TACHYCARDIA SYNDROME: ICD-10-CM

## 2024-06-26 PROCEDURE — 93798 PHYS/QHP OP CAR RHAB W/ECG: CPT | Performed by: INTERNAL MEDICINE

## 2024-06-28 ENCOUNTER — CLINICAL SUPPORT (OUTPATIENT)
Dept: CARDIAC REHAB | Facility: CLINIC | Age: 39
End: 2024-06-28
Payer: COMMERCIAL

## 2024-06-28 DIAGNOSIS — G90.A POSTURAL ORTHOSTATIC TACHYCARDIA SYNDROME: ICD-10-CM

## 2024-06-28 PROCEDURE — 93798 PHYS/QHP OP CAR RHAB W/ECG: CPT | Performed by: INTERNAL MEDICINE

## 2024-07-01 ENCOUNTER — CLINICAL SUPPORT (OUTPATIENT)
Dept: CARDIAC REHAB | Facility: CLINIC | Age: 39
End: 2024-07-01
Payer: COMMERCIAL

## 2024-07-01 DIAGNOSIS — G90.A POSTURAL ORTHOSTATIC TACHYCARDIA SYNDROME: ICD-10-CM

## 2024-07-01 PROCEDURE — 93798 PHYS/QHP OP CAR RHAB W/ECG: CPT | Performed by: INTERNAL MEDICINE

## 2024-07-03 ENCOUNTER — CLINICAL SUPPORT (OUTPATIENT)
Dept: CARDIAC REHAB | Facility: CLINIC | Age: 39
End: 2024-07-03
Payer: COMMERCIAL

## 2024-07-03 DIAGNOSIS — G90.A POSTURAL ORTHOSTATIC TACHYCARDIA SYNDROME: ICD-10-CM

## 2024-07-03 PROCEDURE — 93798 PHYS/QHP OP CAR RHAB W/ECG: CPT | Performed by: INTERNAL MEDICINE

## 2024-07-05 ENCOUNTER — CLINICAL SUPPORT (OUTPATIENT)
Dept: CARDIAC REHAB | Facility: CLINIC | Age: 39
End: 2024-07-05
Payer: COMMERCIAL

## 2024-07-05 DIAGNOSIS — G90.A POSTURAL ORTHOSTATIC TACHYCARDIA SYNDROME: ICD-10-CM

## 2024-07-05 PROCEDURE — 93798 PHYS/QHP OP CAR RHAB W/ECG: CPT | Performed by: INTERNAL MEDICINE

## 2024-07-08 ENCOUNTER — CLINICAL SUPPORT (OUTPATIENT)
Dept: CARDIAC REHAB | Facility: CLINIC | Age: 39
End: 2024-07-08
Payer: COMMERCIAL

## 2024-07-08 DIAGNOSIS — G90.A POSTURAL ORTHOSTATIC TACHYCARDIA SYNDROME: ICD-10-CM

## 2024-07-08 PROCEDURE — 93798 PHYS/QHP OP CAR RHAB W/ECG: CPT | Performed by: INTERNAL MEDICINE

## 2024-07-10 ENCOUNTER — CLINICAL SUPPORT (OUTPATIENT)
Dept: CARDIAC REHAB | Facility: CLINIC | Age: 39
End: 2024-07-10
Payer: COMMERCIAL

## 2024-07-10 DIAGNOSIS — G90.A POSTURAL ORTHOSTATIC TACHYCARDIA SYNDROME: ICD-10-CM

## 2024-07-10 PROCEDURE — 93798 PHYS/QHP OP CAR RHAB W/ECG: CPT | Performed by: INTERNAL MEDICINE

## 2024-07-12 ENCOUNTER — CLINICAL SUPPORT (OUTPATIENT)
Dept: CARDIAC REHAB | Facility: CLINIC | Age: 39
End: 2024-07-12
Payer: COMMERCIAL

## 2024-07-12 DIAGNOSIS — G90.A POSTURAL ORTHOSTATIC TACHYCARDIA SYNDROME: ICD-10-CM

## 2024-07-12 PROCEDURE — 93798 PHYS/QHP OP CAR RHAB W/ECG: CPT | Performed by: INTERNAL MEDICINE

## 2024-07-15 ENCOUNTER — CLINICAL SUPPORT (OUTPATIENT)
Dept: CARDIAC REHAB | Facility: CLINIC | Age: 39
End: 2024-07-15
Payer: COMMERCIAL

## 2024-07-15 DIAGNOSIS — G90.A POSTURAL ORTHOSTATIC TACHYCARDIA SYNDROME: ICD-10-CM

## 2024-07-15 PROCEDURE — 93798 PHYS/QHP OP CAR RHAB W/ECG: CPT | Performed by: INTERNAL MEDICINE

## 2024-07-17 ENCOUNTER — CLINICAL SUPPORT (OUTPATIENT)
Dept: CARDIAC REHAB | Facility: CLINIC | Age: 39
End: 2024-07-17
Payer: COMMERCIAL

## 2024-07-17 DIAGNOSIS — G90.A POSTURAL ORTHOSTATIC TACHYCARDIA SYNDROME: ICD-10-CM

## 2024-07-17 PROCEDURE — 93798 PHYS/QHP OP CAR RHAB W/ECG: CPT | Performed by: INTERNAL MEDICINE

## 2024-07-19 ENCOUNTER — CLINICAL SUPPORT (OUTPATIENT)
Dept: CARDIAC REHAB | Facility: CLINIC | Age: 39
End: 2024-07-19
Payer: COMMERCIAL

## 2024-07-19 DIAGNOSIS — G90.A POSTURAL ORTHOSTATIC TACHYCARDIA SYNDROME: ICD-10-CM

## 2024-07-19 PROCEDURE — 93798 PHYS/QHP OP CAR RHAB W/ECG: CPT | Performed by: INTERNAL MEDICINE

## 2024-07-22 ENCOUNTER — CLINICAL SUPPORT (OUTPATIENT)
Dept: CARDIAC REHAB | Facility: CLINIC | Age: 39
End: 2024-07-22
Payer: COMMERCIAL

## 2024-07-22 DIAGNOSIS — G90.A POSTURAL ORTHOSTATIC TACHYCARDIA SYNDROME: ICD-10-CM

## 2024-07-22 PROCEDURE — 93798 PHYS/QHP OP CAR RHAB W/ECG: CPT | Performed by: INTERNAL MEDICINE

## 2024-07-24 ENCOUNTER — CLINICAL SUPPORT (OUTPATIENT)
Dept: CARDIAC REHAB | Facility: CLINIC | Age: 39
End: 2024-07-24
Payer: COMMERCIAL

## 2024-07-24 DIAGNOSIS — G90.A POSTURAL ORTHOSTATIC TACHYCARDIA SYNDROME: ICD-10-CM

## 2024-07-24 PROCEDURE — 93798 PHYS/QHP OP CAR RHAB W/ECG: CPT | Performed by: INTERNAL MEDICINE

## 2024-07-25 ENCOUNTER — APPOINTMENT (OUTPATIENT)
Dept: PHYSICAL THERAPY | Facility: CLINIC | Age: 39
End: 2024-07-25
Payer: COMMERCIAL

## 2024-07-26 ENCOUNTER — CLINICAL SUPPORT (OUTPATIENT)
Dept: CARDIAC REHAB | Facility: CLINIC | Age: 39
End: 2024-07-26
Payer: COMMERCIAL

## 2024-07-26 DIAGNOSIS — G90.A POSTURAL ORTHOSTATIC TACHYCARDIA SYNDROME: ICD-10-CM

## 2024-07-26 PROCEDURE — 93798 PHYS/QHP OP CAR RHAB W/ECG: CPT | Performed by: INTERNAL MEDICINE

## 2024-07-29 ENCOUNTER — CLINICAL SUPPORT (OUTPATIENT)
Dept: CARDIAC REHAB | Facility: CLINIC | Age: 39
End: 2024-07-29
Payer: COMMERCIAL

## 2024-07-29 DIAGNOSIS — G90.A POSTURAL ORTHOSTATIC TACHYCARDIA SYNDROME: ICD-10-CM

## 2024-07-31 ENCOUNTER — CLINICAL SUPPORT (OUTPATIENT)
Dept: CARDIAC REHAB | Facility: CLINIC | Age: 39
End: 2024-07-31
Payer: COMMERCIAL

## 2024-07-31 DIAGNOSIS — G90.A POSTURAL ORTHOSTATIC TACHYCARDIA SYNDROME: ICD-10-CM

## 2024-08-01 ENCOUNTER — APPOINTMENT (OUTPATIENT)
Dept: PHYSICAL THERAPY | Facility: CLINIC | Age: 39
End: 2024-08-01
Payer: COMMERCIAL

## 2024-08-02 ENCOUNTER — CLINICAL SUPPORT (OUTPATIENT)
Dept: CARDIAC REHAB | Facility: CLINIC | Age: 39
End: 2024-08-02
Payer: COMMERCIAL

## 2024-08-02 DIAGNOSIS — G90.A POSTURAL ORTHOSTATIC TACHYCARDIA SYNDROME: ICD-10-CM

## 2024-08-02 PROCEDURE — 93798 PHYS/QHP OP CAR RHAB W/ECG: CPT | Performed by: INTERNAL MEDICINE

## 2024-08-05 ENCOUNTER — CLINICAL SUPPORT (OUTPATIENT)
Dept: CARDIAC REHAB | Facility: CLINIC | Age: 39
End: 2024-08-05
Payer: COMMERCIAL

## 2024-08-05 DIAGNOSIS — G90.A POSTURAL ORTHOSTATIC TACHYCARDIA SYNDROME: ICD-10-CM

## 2024-08-07 ENCOUNTER — CLINICAL SUPPORT (OUTPATIENT)
Dept: CARDIAC REHAB | Facility: CLINIC | Age: 39
End: 2024-08-07
Payer: COMMERCIAL

## 2024-08-07 DIAGNOSIS — G90.A POSTURAL ORTHOSTATIC TACHYCARDIA SYNDROME: ICD-10-CM

## 2024-08-07 PROCEDURE — 93798 PHYS/QHP OP CAR RHAB W/ECG: CPT | Performed by: INTERNAL MEDICINE

## 2024-08-08 ENCOUNTER — APPOINTMENT (OUTPATIENT)
Dept: NEUROLOGY | Facility: HOSPITAL | Age: 39
End: 2024-08-08
Payer: COMMERCIAL

## 2024-08-08 ENCOUNTER — APPOINTMENT (OUTPATIENT)
Dept: PHYSICAL THERAPY | Facility: CLINIC | Age: 39
End: 2024-08-08
Payer: COMMERCIAL

## 2024-08-09 ENCOUNTER — CLINICAL SUPPORT (OUTPATIENT)
Dept: CARDIAC REHAB | Facility: CLINIC | Age: 39
End: 2024-08-09
Payer: COMMERCIAL

## 2024-08-09 DIAGNOSIS — G90.A POSTURAL ORTHOSTATIC TACHYCARDIA SYNDROME: ICD-10-CM

## 2024-08-09 PROCEDURE — 93798 PHYS/QHP OP CAR RHAB W/ECG: CPT | Performed by: INTERNAL MEDICINE

## 2024-08-12 ENCOUNTER — CLINICAL SUPPORT (OUTPATIENT)
Dept: CARDIAC REHAB | Facility: CLINIC | Age: 39
End: 2024-08-12
Payer: COMMERCIAL

## 2024-08-12 ENCOUNTER — APPOINTMENT (OUTPATIENT)
Dept: CARDIAC REHAB | Facility: CLINIC | Age: 39
End: 2024-08-12
Payer: COMMERCIAL

## 2024-08-12 DIAGNOSIS — G90.A POSTURAL ORTHOSTATIC TACHYCARDIA SYNDROME: ICD-10-CM

## 2024-08-12 PROCEDURE — 93798 PHYS/QHP OP CAR RHAB W/ECG: CPT | Performed by: INTERNAL MEDICINE

## 2024-08-14 ENCOUNTER — APPOINTMENT (OUTPATIENT)
Dept: CARDIAC REHAB | Facility: CLINIC | Age: 39
End: 2024-08-14
Payer: COMMERCIAL

## 2024-08-14 ENCOUNTER — CLINICAL SUPPORT (OUTPATIENT)
Dept: CARDIAC REHAB | Facility: CLINIC | Age: 39
End: 2024-08-14
Payer: COMMERCIAL

## 2024-08-14 DIAGNOSIS — G90.A POSTURAL ORTHOSTATIC TACHYCARDIA SYNDROME: ICD-10-CM

## 2024-08-14 PROCEDURE — 93798 PHYS/QHP OP CAR RHAB W/ECG: CPT | Performed by: INTERNAL MEDICINE

## 2024-08-15 ENCOUNTER — APPOINTMENT (OUTPATIENT)
Dept: NEUROLOGY | Facility: HOSPITAL | Age: 39
End: 2024-08-15
Payer: COMMERCIAL

## 2024-08-16 ENCOUNTER — APPOINTMENT (OUTPATIENT)
Dept: CARDIAC REHAB | Facility: CLINIC | Age: 39
End: 2024-08-16
Payer: COMMERCIAL

## 2024-08-19 ENCOUNTER — APPOINTMENT (OUTPATIENT)
Dept: CARDIAC REHAB | Facility: CLINIC | Age: 39
End: 2024-08-19
Payer: COMMERCIAL

## 2024-08-20 ENCOUNTER — APPOINTMENT (OUTPATIENT)
Dept: CARDIAC REHAB | Facility: CLINIC | Age: 39
End: 2024-08-20
Payer: COMMERCIAL

## 2024-08-20 ENCOUNTER — APPOINTMENT (OUTPATIENT)
Dept: PHYSICAL THERAPY | Facility: CLINIC | Age: 39
End: 2024-08-20
Payer: COMMERCIAL

## 2024-08-21 ENCOUNTER — CLINICAL SUPPORT (OUTPATIENT)
Dept: CARDIAC REHAB | Facility: CLINIC | Age: 39
End: 2024-08-21
Payer: COMMERCIAL

## 2024-08-21 DIAGNOSIS — G90.A POSTURAL ORTHOSTATIC TACHYCARDIA SYNDROME: ICD-10-CM

## 2024-08-21 PROCEDURE — 93798 PHYS/QHP OP CAR RHAB W/ECG: CPT | Performed by: INTERNAL MEDICINE

## 2024-08-23 ENCOUNTER — CLINICAL SUPPORT (OUTPATIENT)
Dept: CARDIAC REHAB | Facility: CLINIC | Age: 39
End: 2024-08-23
Payer: COMMERCIAL

## 2024-08-23 DIAGNOSIS — G90.A POSTURAL ORTHOSTATIC TACHYCARDIA SYNDROME: ICD-10-CM

## 2024-08-23 PROCEDURE — 93798 PHYS/QHP OP CAR RHAB W/ECG: CPT | Performed by: INTERNAL MEDICINE

## 2024-08-26 ENCOUNTER — CLINICAL SUPPORT (OUTPATIENT)
Dept: CARDIAC REHAB | Facility: CLINIC | Age: 39
End: 2024-08-26
Payer: COMMERCIAL

## 2024-08-26 DIAGNOSIS — G90.A POSTURAL ORTHOSTATIC TACHYCARDIA SYNDROME: ICD-10-CM

## 2024-08-26 PROCEDURE — 93798 PHYS/QHP OP CAR RHAB W/ECG: CPT | Performed by: INTERNAL MEDICINE

## 2024-08-28 ENCOUNTER — CLINICAL SUPPORT (OUTPATIENT)
Dept: CARDIAC REHAB | Facility: CLINIC | Age: 39
End: 2024-08-28
Payer: COMMERCIAL

## 2024-08-28 ENCOUNTER — EVALUATION (OUTPATIENT)
Dept: PHYSICAL THERAPY | Facility: CLINIC | Age: 39
End: 2024-08-28
Payer: COMMERCIAL

## 2024-08-28 DIAGNOSIS — G90.A POSTURAL ORTHOSTATIC TACHYCARDIA SYNDROME: ICD-10-CM

## 2024-08-28 DIAGNOSIS — Z98.890 POST-OPERATIVE STATE: ICD-10-CM

## 2024-08-28 DIAGNOSIS — R10.2 PELVIC PAIN: Primary | ICD-10-CM

## 2024-08-28 PROCEDURE — 97112 NEUROMUSCULAR REEDUCATION: CPT | Mod: GP | Performed by: PHYSICAL THERAPIST

## 2024-08-28 PROCEDURE — 97162 PT EVAL MOD COMPLEX 30 MIN: CPT | Mod: GP | Performed by: PHYSICAL THERAPIST

## 2024-08-28 PROCEDURE — 97110 THERAPEUTIC EXERCISES: CPT | Mod: GP | Performed by: PHYSICAL THERAPIST

## 2024-08-28 PROCEDURE — 93798 PHYS/QHP OP CAR RHAB W/ECG: CPT | Performed by: INTERNAL MEDICINE

## 2024-08-28 NOTE — PROGRESS NOTES
Physical Therapy Pelvic Floor Evaluation    Patient Name: Deonna Saleem  MRN: [id number]  Evaluation Date: 8/28/2024  Time Calculation  Start Time: 1130  Stop Time: 1230  Time Calculation (min): 60 min  PT Evaluation Time Entry  PT Evaluation (Moderate) Time Entry: 35     PT Therapeutic Procedures Time Entry  Neuromuscular Re-Education Time Entry: 13  Therapeutic Exercise Time Entry: 10       Problem List Items Addressed This Visit             ICD-10-CM    Pelvic pain - Primary R10.2     Other Visit Diagnoses         Codes    Post-operative state     Z98.890     Subjective    Precautions:  Precautions  Precautions Comment: POTS  Pain:     See below for pelvic pain/hip  PELVIC HISTORY:  Chief Complaint/Description of Symptoms:   Had Hysterecomy with removal of tubers with repair of enterocele on 5/13/24.  Still Having pain in left hip and groin area, tailbone (as prior to surgery).  Pain is making get off chair difficult.  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  Patient Primary Goal:   To reduce pain and improve emptying and wiping    PELVIC PAIN:     tailbone/glut 8-9/10 -- feels like tailbone -- sitting for a few minutes-- Has found self sitting more perched   Had intercourse;  Still gets pink discharge post intercourse-- sharp pain after orgasm;    Feels like body is stiff everywhere    MENSTRUAL HISTORY:   Had hysterectomy    OB HISTORY:  1 son    BLADDER:     Daytime Voiding Frequency: 4-5 times  Nighttime Voiding Frequency: lately has been more  Leakage occurs with: post urinating   Slow/weak urine stream: not straight all the time  Able to completely empty bladder: no    BOWEL:     BM Frequency: regular  Wipes numeorus times;  and then has some when wipes again a few hours later.      FLUID/DIET INTAKE:  Benefiber,      EXERCISE:  Current exercise regime:   In cardiac rehab    Objective    POSTURE/ALIGNMENT:  Increase in lumbar lordosis, right anterior rotation,      ROM:  Lumbar ROM Range   Flexion full   Extension 50%     Hip AROM L R   Hip ROM planes  WFL deg WFL deg     MMT:  Hip MMT L R   Hip Flexion 4+/5 4+/5   Hip Extension 4/5 4/5   Hip Abduction 4/5 4/5      TA: fair TA activation, poor load transfer with active SLR, diaphragmatic breathing when cued.      FLEXIBILITY R/L:  Hamstrings: tight/tight  Piriformis bilateral tightness more tender left than right    EXTERNAL MUSCLE PALPATION:  Tender on left gluteal medius, tender coccyx and long dorsal sacral ligament bilateral,     PELVIC FLOOR  EXTERNAL PALPATION:  Levator Ani: tender bilateral   Transverse perineum: tender     INTERNAL VAGINAL EXAMINATION WITH PATIENT CONSENT:  Patient position:  hooklying  Internal:  Deep layer:  Tender iliococcygeus and pubococcygeus bilateral, tightness  Muscle Excursion  Limited relaxation after contraction  Strength  3/5    Outcome Measures:  NIH-CPSI: 25/44  Pain: 10  Urinary: 4  Quality of Life Impact: 11     Treatments:  Therapeutic Exercise:    Happy baby modified 10 sec x 3   SKTC 10 sec x 3   Neuro Re-education:    Discussed breathing mechanics and influence on pelvic floor    OP EDUCATION:  Outpatient Education  Individual(s) Educated: Patient  Education Provided: Anatomy, Home Exercise Program, Physiology, POC  Risk and Benefits Discussed with Patient/Caregiver/Other: yes  Patient/Caregiver Demonstrated Understanding: yes  Plan of Care Discussed and Agreed Upon: yes  Patient Response to Education: Patient/Caregiver Verbalized Understanding of Information, Patient/Caregiver Performed Return Demonstration of Exercises/Activities, Patient/Caregiver Asked Appropriate Questions    Assessment   PT Assessment Results: Decreased strength, Decreased coordination, Decreased endurance, Pain  Rehab Prognosis: Good  Evaluation/Treatment Tolerance: Patient tolerated treatment well    Pt is a 39 y.o. female who  presents with impairments of weakness, tightness, pain. These impairments have led to functional limitations including pain in sitting, pain with intercourse. Pt would benefit from skilled physical therapy intervention to improve above impairments and facilitate return to function.     Plan:  Treatment/Interventions: Aquatic therapy, Dry needling, Biofeedback, Education/ Instruction, Electrical stimulation, Manual therapy, Neuromuscular re-education, Therapeutic activities, Therapeutic exercises  PT Plan: Skilled PT  PT Frequency: 1 time per week  Duration: 10 sessions  Number of Treatments Authorized: 60 Visits PT/OT/ST  Rehab Potential: Good  Plan of Care Agreement: Patient  Goals:  Active       PT Pelvic Floor Goals       STGs - 5 sessions       Start:  08/28/24    Expected End:  10/17/24       1)  Patient will be knowledgeable with regards to downtraining techniques to improve relaxation of pelvic floor muscles  2)  Patient will report 25% less pain/symptoms during sitting  3)  Patient will perform diaphragmatic breathing properly to relax and contract pelvic floor muscle appropriately  4)  Patient will demonstrate good transverse abdominis activation to stabilize lumbopelvic girdle during ADLs          LTGs - 10 sessions       Start:  08/28/24    Expected End:  12/06/24       1)  Patient will report improved sleep with less waking to void (will only wake 1 time a night)   2)  Patient will increase tolerance to internal penetration for examination with min to nil pain  3)  Patient will reduce urine loss to nil post urination  4)  Patient will be able to return to prior level of function with pain of 2-3/10 in hip/buttock to sit;    5)  Patient will improve pelvic floor contraction to by 1/2-1 MMT  with coordinated exhale for improved continence          Patient Stated Goal 1       Start:  08/28/24    Expected End:  12/06/24       To reduce pain and increase mobility

## 2024-08-30 ENCOUNTER — CLINICAL SUPPORT (OUTPATIENT)
Dept: CARDIAC REHAB | Facility: CLINIC | Age: 39
End: 2024-08-30
Payer: COMMERCIAL

## 2024-08-30 DIAGNOSIS — G90.A POSTURAL ORTHOSTATIC TACHYCARDIA SYNDROME: ICD-10-CM

## 2024-08-30 PROCEDURE — 93798 PHYS/QHP OP CAR RHAB W/ECG: CPT | Performed by: INTERNAL MEDICINE

## 2024-09-03 ENCOUNTER — CLINICAL SUPPORT (OUTPATIENT)
Dept: CARDIAC REHAB | Facility: CLINIC | Age: 39
End: 2024-09-03
Payer: COMMERCIAL

## 2024-09-03 DIAGNOSIS — G90.A POSTURAL ORTHOSTATIC TACHYCARDIA SYNDROME: ICD-10-CM

## 2024-09-04 ENCOUNTER — CLINICAL SUPPORT (OUTPATIENT)
Dept: CARDIAC REHAB | Facility: CLINIC | Age: 39
End: 2024-09-04
Payer: COMMERCIAL

## 2024-09-04 DIAGNOSIS — G90.A POSTURAL ORTHOSTATIC TACHYCARDIA SYNDROME: ICD-10-CM

## 2024-09-04 PROCEDURE — 93798 PHYS/QHP OP CAR RHAB W/ECG: CPT | Performed by: INTERNAL MEDICINE

## 2024-09-05 ENCOUNTER — CLINICAL SUPPORT (OUTPATIENT)
Dept: CARDIAC REHAB | Facility: CLINIC | Age: 39
End: 2024-09-05
Payer: COMMERCIAL

## 2024-09-05 DIAGNOSIS — G90.A POSTURAL ORTHOSTATIC TACHYCARDIA SYNDROME: ICD-10-CM

## 2024-09-05 PROCEDURE — 93798 PHYS/QHP OP CAR RHAB W/ECG: CPT | Performed by: INTERNAL MEDICINE

## 2024-09-06 ENCOUNTER — APPOINTMENT (OUTPATIENT)
Dept: CARDIAC REHAB | Facility: CLINIC | Age: 39
End: 2024-09-06
Payer: COMMERCIAL

## 2024-09-09 ENCOUNTER — CLINICAL SUPPORT (OUTPATIENT)
Dept: CARDIAC REHAB | Facility: CLINIC | Age: 39
End: 2024-09-09
Payer: COMMERCIAL

## 2024-09-09 DIAGNOSIS — G90.A POSTURAL ORTHOSTATIC TACHYCARDIA SYNDROME: ICD-10-CM

## 2024-09-09 PROCEDURE — 93798 PHYS/QHP OP CAR RHAB W/ECG: CPT | Performed by: INTERNAL MEDICINE

## 2024-09-11 ENCOUNTER — CLINICAL SUPPORT (OUTPATIENT)
Dept: CARDIAC REHAB | Facility: CLINIC | Age: 39
End: 2024-09-11
Payer: COMMERCIAL

## 2024-09-11 DIAGNOSIS — G90.A POSTURAL ORTHOSTATIC TACHYCARDIA SYNDROME: ICD-10-CM

## 2024-09-11 PROCEDURE — 93798 PHYS/QHP OP CAR RHAB W/ECG: CPT | Performed by: INTERNAL MEDICINE

## 2024-09-13 ENCOUNTER — CLINICAL SUPPORT (OUTPATIENT)
Dept: CARDIAC REHAB | Facility: CLINIC | Age: 39
End: 2024-09-13
Payer: COMMERCIAL

## 2024-09-13 DIAGNOSIS — G90.A POSTURAL ORTHOSTATIC TACHYCARDIA SYNDROME: ICD-10-CM

## 2024-09-13 PROCEDURE — 93798 PHYS/QHP OP CAR RHAB W/ECG: CPT | Performed by: INTERNAL MEDICINE

## 2024-09-20 ENCOUNTER — APPOINTMENT (OUTPATIENT)
Dept: CARDIAC REHAB | Facility: CLINIC | Age: 39
End: 2024-09-20
Payer: COMMERCIAL

## 2024-09-23 ENCOUNTER — CLINICAL SUPPORT (OUTPATIENT)
Dept: CARDIAC REHAB | Facility: CLINIC | Age: 39
End: 2024-09-23
Payer: COMMERCIAL

## 2024-09-23 DIAGNOSIS — G90.A POSTURAL ORTHOSTATIC TACHYCARDIA SYNDROME: ICD-10-CM

## 2024-09-23 PROCEDURE — 93798 PHYS/QHP OP CAR RHAB W/ECG: CPT | Performed by: INTERNAL MEDICINE

## 2024-09-25 ENCOUNTER — APPOINTMENT (OUTPATIENT)
Dept: CARDIAC REHAB | Facility: CLINIC | Age: 39
End: 2024-09-25
Payer: COMMERCIAL

## 2024-09-25 DIAGNOSIS — G90.A POSTURAL ORTHOSTATIC TACHYCARDIA SYNDROME: ICD-10-CM

## 2024-09-25 PROCEDURE — 93798 PHYS/QHP OP CAR RHAB W/ECG: CPT | Performed by: INTERNAL MEDICINE

## 2024-09-27 ENCOUNTER — APPOINTMENT (OUTPATIENT)
Dept: CARDIAC REHAB | Facility: CLINIC | Age: 39
End: 2024-09-27
Payer: COMMERCIAL

## 2024-09-27 ENCOUNTER — APPOINTMENT (OUTPATIENT)
Dept: PHYSICAL THERAPY | Facility: CLINIC | Age: 39
End: 2024-09-27
Payer: COMMERCIAL

## 2024-09-27 DIAGNOSIS — G90.A POSTURAL ORTHOSTATIC TACHYCARDIA SYNDROME: ICD-10-CM

## 2024-09-27 PROCEDURE — 93798 PHYS/QHP OP CAR RHAB W/ECG: CPT | Performed by: INTERNAL MEDICINE

## 2024-09-30 ENCOUNTER — APPOINTMENT (OUTPATIENT)
Dept: CARDIAC REHAB | Facility: CLINIC | Age: 39
End: 2024-09-30
Payer: COMMERCIAL

## 2024-09-30 ENCOUNTER — HOSPITAL ENCOUNTER (OUTPATIENT)
Dept: RADIOLOGY | Facility: HOSPITAL | Age: 39
Discharge: HOME | End: 2024-09-30
Payer: COMMERCIAL

## 2024-09-30 DIAGNOSIS — G90.A POSTURAL ORTHOSTATIC TACHYCARDIA SYNDROME: ICD-10-CM

## 2024-09-30 DIAGNOSIS — Z13.6 ENCOUNTER FOR SCREENING FOR CARDIOVASCULAR DISORDERS: ICD-10-CM

## 2024-09-30 PROCEDURE — 93798 PHYS/QHP OP CAR RHAB W/ECG: CPT | Performed by: INTERNAL MEDICINE

## 2024-09-30 PROCEDURE — 75571 CT HRT W/O DYE W/CA TEST: CPT

## 2024-10-02 ENCOUNTER — CLINICAL SUPPORT (OUTPATIENT)
Dept: CARDIAC REHAB | Facility: CLINIC | Age: 39
End: 2024-10-02
Payer: COMMERCIAL

## 2024-10-02 DIAGNOSIS — G90.A POSTURAL ORTHOSTATIC TACHYCARDIA SYNDROME: ICD-10-CM

## 2024-10-02 PROCEDURE — 93798 PHYS/QHP OP CAR RHAB W/ECG: CPT | Performed by: INTERNAL MEDICINE

## 2024-10-03 ENCOUNTER — APPOINTMENT (OUTPATIENT)
Dept: PHYSICAL THERAPY | Facility: CLINIC | Age: 39
End: 2024-10-03
Payer: COMMERCIAL

## 2024-10-10 ENCOUNTER — APPOINTMENT (OUTPATIENT)
Dept: PHYSICAL THERAPY | Facility: CLINIC | Age: 39
End: 2024-10-10
Payer: COMMERCIAL

## 2024-10-17 ENCOUNTER — APPOINTMENT (OUTPATIENT)
Dept: PHYSICAL THERAPY | Facility: CLINIC | Age: 39
End: 2024-10-17
Payer: COMMERCIAL

## 2024-10-18 ENCOUNTER — APPOINTMENT (OUTPATIENT)
Dept: CARDIAC REHAB | Facility: CLINIC | Age: 39
End: 2024-10-18
Payer: COMMERCIAL

## 2024-10-21 ENCOUNTER — APPOINTMENT (OUTPATIENT)
Dept: CARDIAC REHAB | Facility: CLINIC | Age: 39
End: 2024-10-21
Payer: COMMERCIAL

## 2024-10-23 ENCOUNTER — APPOINTMENT (OUTPATIENT)
Dept: CARDIAC REHAB | Facility: CLINIC | Age: 39
End: 2024-10-23
Payer: COMMERCIAL

## 2024-10-24 ENCOUNTER — APPOINTMENT (OUTPATIENT)
Dept: PHYSICAL THERAPY | Facility: CLINIC | Age: 39
End: 2024-10-24
Payer: COMMERCIAL

## 2024-10-25 ENCOUNTER — APPOINTMENT (OUTPATIENT)
Dept: CARDIAC REHAB | Facility: CLINIC | Age: 39
End: 2024-10-25
Payer: COMMERCIAL

## 2024-10-25 ENCOUNTER — TREATMENT (OUTPATIENT)
Dept: PHYSICAL THERAPY | Facility: CLINIC | Age: 39
End: 2024-10-25
Payer: COMMERCIAL

## 2024-10-25 DIAGNOSIS — R10.2 PELVIC PAIN: ICD-10-CM

## 2024-10-25 PROCEDURE — 97140 MANUAL THERAPY 1/> REGIONS: CPT | Mod: GP | Performed by: PHYSICAL THERAPIST

## 2024-10-25 PROCEDURE — 97530 THERAPEUTIC ACTIVITIES: CPT | Mod: GP | Performed by: PHYSICAL THERAPIST

## 2024-10-25 PROCEDURE — 97110 THERAPEUTIC EXERCISES: CPT | Mod: GP | Performed by: PHYSICAL THERAPIST

## 2024-10-25 NOTE — PROGRESS NOTES
Physical Therapy Treatment    Patient Name: Deonna Saleem  MRN: [id number]  Encounter date:  10/25/2024  Time Calculation  Start Time: 1100  Stop Time: 1200  Time Calculation (min): 60 min     PT Therapeutic Procedures Time Entry  Manual Therapy Time Entry: 20  Therapeutic Exercise Time Entry: 25  Therapeutic Activity Time Entry: 10    Visit Number:  2 (including evaluation)  Planned total visits: 10 per POC  Visits Authorized/Insurance Coverage:  $4000 DED-MET, 80/20 COVERAGE, 60V PT/OT/ST, NO AUTH     Current Problem  Problem List Items Addressed This Visit             ICD-10-CM    Pelvic pain R10.2       Surgery  Hysterecomy with removal of tubers with repair of enterocele on 5/13/24     Precautions     POTS  Pain     10/10 coccyx pain  Subjective  General        [facility]  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      Treatment:  Manual:    Verbal informed consent post discussion of risks and benefits.    Palpation to reveal tender areas.  Safety zones assessed.    Dry needling performed with pt positioned prone    4 15mm needles inserted in on and around coccyx  1 30mm medial piriformis ,   Needles left in situ for 3 minutes.  Needles wound/pistoned prior to removal.  Universal and standard precautions maintained.  No adverse reaction noted post needle removal.     MFR post needling to reduce tightness  Light therapy:    UV and Red light 6 j/cm2 to coccyx 3 x 44sec  Therapeutic activity:    Discussed coccyx cushion, discussed sitting posture and influence on coccyx pain  Therapeutic exercise:    Happy Baby with theraball red 3 diaphragmatic breathes x 3   LTR with theraball x  10  SKTC 3 diaphragmatic breathes x 3 bilateral   Hip IR/ER x 10   Piriformis stretch (cross over) x 3 diaphragmatic breathes x 3      Current HEP:  Access Code: [email or web address]  URL: [email or web address]  Date: 10/25/2024  Prepared by: Domenica Olson    Exercises  - Supine Posterior Pelvic Tilt  - 1 x daily - 7 x weekly - 1 sets - 10 reps  - Supine Hip Internal and External Rotation  - 1 x daily - 7 x weekly - 1 sets - 10 reps  - Supine Pelvic Floor Stretch  - 1 x daily - 7 x weekly - 1 sets - 3 reps - 3-4 belly breathes hold    OP EDUCATION:     Pain reduction, coccyx mechanics, and updated HEP    Assessment:     Pt's response to treatment:  Patient with slightly less pain post session.  May need mobilization to coccyx.  Patient needs to be adherent to pelvic floor stretching to assist in coccyx mobility.      Pain end of session: 8/10    Plan:     Continue with current POC/no changes    Assessment of current progress against goals:  Insufficient treatment time to assess progress    Goals:  Active       PT Pelvic Floor Goals       STGs - 5 sessions       Start:  08/28/24    Expected End:  10/17/24       1)  Patient will be knowledgeable with regards to downtraining techniques to improve relaxation of pelvic floor muscles  2)  Patient will report 25% less pain/symptoms during sitting  3)  Patient will perform diaphragmatic breathing properly to relax and contract pelvic floor muscle appropriately  4)  Patient will demonstrate good transverse abdominis activation to stabilize lumbopelvic girdle during ADLs          LTGs - 10 sessions       Start:  08/28/24    Expected End:  12/06/24       1)  Patient will report improved sleep with less waking to void (will only wake 1 time a night)   2)  Patient will increase tolerance to internal penetration for examination with min to nil pain  3)  Patient will reduce urine loss to nil post urination  4)  Patient will be able to return to prior level of function with pain of  2-3/10 in hip/buttock to sit;    5)  Patient will improve pelvic floor contraction to by 1/2-1 MMT  with coordinated exhale for improved continence          Patient Stated Goal 1       Start:  08/28/24    Expected End:  12/06/24       To reduce pain and increase mobility

## 2024-10-28 ENCOUNTER — APPOINTMENT (OUTPATIENT)
Dept: CARDIAC REHAB | Facility: CLINIC | Age: 39
End: 2024-10-28
Payer: COMMERCIAL

## 2024-10-30 ENCOUNTER — APPOINTMENT (OUTPATIENT)
Dept: CARDIAC REHAB | Facility: CLINIC | Age: 39
End: 2024-10-30
Payer: COMMERCIAL

## 2024-10-31 ENCOUNTER — APPOINTMENT (OUTPATIENT)
Dept: PHYSICAL THERAPY | Facility: CLINIC | Age: 39
End: 2024-10-31
Payer: COMMERCIAL

## 2024-11-01 ENCOUNTER — TELEPHONE (OUTPATIENT)
Dept: SURGERY | Facility: CLINIC | Age: 39
End: 2024-11-01
Payer: COMMERCIAL

## 2024-11-04 ENCOUNTER — TELEPHONE (OUTPATIENT)
Dept: SURGERY | Facility: CLINIC | Age: 39
End: 2024-11-04
Payer: COMMERCIAL

## 2024-11-04 NOTE — PROGRESS NOTES
Subjective     Date: 11/11/2024 Time: 1:49 PM  Name: Deonna Saleem  MRN: [id number]    This is a 39 y.o. female with morbid obesity (Body mass index is 33.05 kg/m².) who presents to clinic for consideration of bariatric surgery. she has attempted and failed multiple diet and exercise regimens for weight loss. Initial Onset of obesity was in childhood.  Their goal for surgery is to  be healthier , lose weight, and prevention of obesity related co-morbidities . The patient has tried multiple diets to lose weight including  dietician monitored, intermittent fasting, kelli richy, keto, low monica, low carb, liquid diet, OTC pills, physician monitored, physician weight loss, prescription medications, vegan/vegetarian, protein sparing modified fast  . The patient was most successful with the  protein sparing modified fast . The most pounds lost on this diet were 30 lbs. The patient considers their dietary weakness to be portion size The patient reports a  highest weight ever of 260 pounds, lowest weight ever of 220 pounds, and frequent weight loss and weight gain. Distribution of Obesity: is central. The patient exercises 2-3 times /week  60 Minutes/Day     Comorbidities: anxiety, back pain, depressed moodtakes medications and sees a therapist/psychiatrist, edema, esophageal reflux, high cholesterol, infertility, joint paindoes not take NSAIDs, knee paindoes not take NSAIDs, polycystic ovarian syndrome, and sleep apnea not using an appliance sleep medicine doctor recommended positional therapy for ALYSSA with the snoogle pillow  Patient Active Problem List   Diagnosis    Pelvic pain    Williamson syndrome    Post-operative state    Acquired hypothyroidism    Adverse reaction to non-steroidal anti-inflammatory drug (NSAID)    Anal skin tag    Depression    Atypical chest pain    Bariatric surgery status    Bilateral fibrocystic breast changes    Bruxism    Calcium blood increased    Chronic tonsillitis    Circadian rhythm sleep  disorder, delayed sleep phase type    Excessive daytime sleepiness    Diffuse cystic mastopathy    Disorder of refraction    Dizziness    Dysmetabolic syndrome    Dyslipidemia    Edema of lower extremity    Essential hypertension    Gastroesophageal reflux disease without esophagitis    Hashimoto's thyroiditis    History of metabolic disorder    Hives    Idiopathic hypersomnia without long sleep time    Hyperuricemia    Infertility, female, primary    Insomnia    Insulin resistance    Joint pain    Lumbosacral radiculopathy    Mild episode of recurrent major depressive disorder (CMS-HCC)    Mixed hyperlipidemia    Moderate episode of recurrent major depressive disorder    Nevus of iris    Non-toxic multinodular goiter    Obesity, Class II, BMI 35-39.9    Obstructive sleep apnea syndrome    Oligomenorrhea    Other neurologic disorders in Lyme disease    Pineal gland cyst    Polycystic ovary syndrome    Polyphagia    POTS (postural orthostatic tachycardia syndrome)    Recurrent major depression in partial remission (CMS-HCC)    Sacroiliitis, not elsewhere classified (CMS-HCC)    Somatic dysfunction of sacral region    Localized edema    Anxiety disorder, unspecified    Personal history of other diseases of the nervous system and sense organs    Personal history of other endocrine, nutritional and metabolic disease       SLEEVE Gastric Surgery For Morbid Obesity Laparoscopic Longitudinal Gastrectomy     2 = Symptoms noticeable and bothersome but not every day heartburn if eats too close to bed time.     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    Localized edema     Edema of leg    Other specified postprocedural states 12/06/2019    Status post LASIK surgery    Other vitreous opacities, unspecified eye 12/06/2019    Vitreous floaters    Personal history of other diseases of the  nervous system and sense organs     History of sleep apnea    Personal history of other endocrine, nutritional and metabolic disease     History of diabetes mellitus    Personal history of other endocrine, nutritional and metabolic disease     History of obesity    Personal history of other endocrine, nutritional and metabolic disease     History of high cholesterol    Personal history of other endocrine, nutritional and metabolic disease     History of Hashimoto thyroiditis    Personal history of other mental and behavioral disorders     History of panic attacks    Personal history of other mental and behavioral disorders 11/17/2013    History of depression    Personal history of other mental and behavioral disorders     History of depression    Personal history of other specified conditions 08/08/2019    History of palpitations    Personal history of urinary calculi     History of kidney stones    Unspecified disorder of refraction 12/06/2019    Refractive error        PSH:   Past Surgical History:   Procedure Laterality Date    CARPAL TUNNEL RELEASE      HYSTERECTOMY  05/10/2024    OTHER SURGICAL HISTORY  11/21/2014    Corneal LASIK Bilateral    OTHER SURGICAL HISTORY  11/21/2014    Wrist Surgery          Denies personal/family hx of VTE.    FAMILY HISTORY:  No family history on file.     SOCIAL HISTORY:  Social History     Tobacco Use    Smoking status: Never    Smokeless tobacco: Never   Vaping Use    Vaping status: Never Used   Substance Use Topics    Alcohol use: Not Currently    Drug use: Not Currently       MEDICATIONS:  Prior to Admission Medications:  Medication Documentation Review Audit       Reviewed by Kwesi Hicks MD (Physician) on 01/17/24 at 0917      Medication Order Taking? Sig Documenting Provider Last Dose Status   clonazePAM (KlonoPIN) 0.5 mg tablet [id number] Yes Take 1 tablet (0.5 mg) by mouth 2 times a day. Historical Provider, MD Taking Active   escitalopram (Lexapro) 20 mg tablet  [id number] Yes Take 1 tablet (20 mg) by mouth once daily. Historical Provider, MD Taking Active   multivitamin tablet [id number] Yes Take 1 tablet by mouth once daily. Historical Provider, MD Taking Active   rosuvastatin (Crestor) 20 mg tablet [id number] Yes Take 1 tablet (20 mg) by mouth once daily. Historical Provider, MD Taking Active                     ALLERGIES:  Allergies   Allergen Reactions    Gadolinium-Containing Contrast Media Other and Hives     2004    Ibuprofen Hives    Iodinated Contrast Media Hives    Lamotrigine Rash       REVIEW OF SYSTEMS:  GENERAL: Negative for malaise, significant weight loss and fever  HEAD: Negative for headache, swelling.  NECK: Negative for lumps, goiter, pain and significant neck swelling  RESPIRATORY: Negative for cough, wheezing or shortness of breath.  CARDIOVASCULAR: Negative for chest pain, leg swelling or palpitations.  GI: Negative for abdominal discomfort, blood in stools or black stools or change in bowel habits  : No history of dysuria, frequency or incontinence  MUSCULOSKELETAL: Negative for joint pain or swelling, back pain or muscle pain.  SKIN: Negative for lesions, rash, and itching.  PSYCH: Negative for sleep disturbance, mood disorder and recent psychosocial stressors.  ENDOCRINE: Negative for cold or heat intolerance, polyuria, polydipsia and goiter.    Objective   PHYSICAL EXAM:  Visit Vitals  Smoking Status Never     General appearance: obese, NAD  Neuro: AOx3  Head: EOMI; no swelling or lesions of scalp or face  ENT: No obvious lesions  Skin: warm, no erythema or rashes  Lungs: clear   Heart: regular rhythm   Abdomen: soft, non-tender  Extremities: No edema   psych: no hurried speech, no flight of ideas, normal affect    Assessment/Plan   IMPRESSION:  Deonna Saleem is a 39 y.o. female with a BMI of Body mass index is 37.43 kg/m². 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    Iris nevus    Localized edema     Edema of leg    Other specified postprocedural states 12/06/2019    Status post LASIK surgery    Other vitreous opacities, unspecified eye 12/06/2019    Vitreous floaters    Personal history of other diseases of the nervous system and sense organs     History of sleep apnea    Personal history of other endocrine, nutritional and metabolic disease     History of diabetes mellitus    Personal history of other endocrine, nutritional and metabolic disease     History of obesity    Personal history of other endocrine, nutritional and metabolic disease     History of high cholesterol    Personal history of other endocrine, nutritional and metabolic disease     History of Hashimoto thyroiditis    Personal history of other mental and behavioral disorders     History of panic attacks    Personal history of other mental and behavioral disorders 11/17/2013    History of depression    Personal history of other mental and behavioral disorders     History of depression    Personal history of other specified conditions 08/08/2019    History of palpitations    Personal history of urinary calculi     History of kidney stones    Unspecified disorder of refraction 12/06/2019    Refractive error       This patient does meet the criteria for a surgical weight loss procedure according to NIH guidelines.    She has tried weight loss medications multiple times and has not been successful the lowest weight she ever achieved was 215 pounds but could not tolerate medications anymore.  She did have fertility issues in the past and due to history of Williamson syndrome she ended up having a hysterectomy.  She has hypercholesterolemia take medications for it and her blood pressure is elevated however she is not on medications for that.    The risks of sleeve gastrectomy, Bhargav-en-Y gastric bypass surgery including but not limited to  bleeding, leak along staple lines, infection, dehydration, ulcers, internal hernia, DVT/PE, pneumonia, myocardial infarction, prolonged nausea/vomiting, incomplete resolution of associated medical conditions, reflux, weight regain, vitamin/mineral deficiencies, and death have been explained to the patient and Deonna Saleem has expressed understanding and acceptance of them.     We discussed the lifestyle changes necessary to be successful following surgery.    She had an EGD done earlier this year at [facility] which revealed grade a esophagitis she does have reflux however it is not bad and typically is after she eats late in the evening or takes her vitamins late in the evening and if she avoids that then symptoms are under control.  She started the program 5 years ago and was scared and did not proceed with it.  She eventually feels she is ready now and wants to proceed with sleeve gastrectomy.    Post sleeve gastrectomy reflux and possible need for gastric bypass in future these issues were discussed at length all her questions related to medical involved in the surgery and possibility of weeks acutely or delayed in the long-term were discussed and all questions were answered.    Patient also has POTS and I explained to her that she will remain sensitive to dehydration and bariatric patients are at a risk of dehydration and possibility of worsening symptoms is there however I cannot quantify the exact risk even though weight loss is helpful in improvement of overall comorbid conditions for the most part.    The increased risk of substance and alcohol abuse following bariatric surgery was discussed with the patient, along with the negative consequences of substance/alcohol use after surgery including addiction, worsening of mental health disorders, and injury to the stomach. The risk of smoking and vaping (tobacco or any other substance) after bariatric surgery was explained to the patient. This includes  risk of anastamotic ulcers, gastritis, bleeding, perforation, stricture, and PO intolerance.  The patient expressed understanding and acceptance of these risks.      The possible benefits of the above surgeries including weight loss, possible improvement/resolution of associated medical and mental health conditions, improved mobility, and decreased mortality have been explained the the patient and Deonna Saleem has expressed understanding and acceptance of them.      PLAN:  The plan of treatment for Deonna Saleem is to continue with the consultations and tests ordered today in hopes of qualifying for pre-operative clearance for bariatric surgery. This includes:    Consult Nutrition for education and 3 months of MSWL  Consult Psychology  Consult Cardiology  Labs ordered  EGD - last EGD 2019   PCP for medical optimization  Consult sleep medicine - concern for ALYSSA -- Sleep Study completed via [facility], Mild ALYSSA   Recommend at least 10-20 lbs of weight loss prior to surgery.

## 2024-11-04 NOTE — TELEPHONE ENCOUNTER
Spoke with patient about the requirements for bariatric surgery and the need for a co- morbidity. Patient stated that she had been diagnosed with mild sleep apnea about a year ago, but was advised that she will not need a cpap. Explained to qualify for surgery she will need to have the sleep apnea treated with cpap.

## 2024-11-07 ENCOUNTER — APPOINTMENT (OUTPATIENT)
Dept: PHYSICAL THERAPY | Facility: CLINIC | Age: 39
End: 2024-11-07
Payer: COMMERCIAL

## 2024-11-11 PROBLEM — K21.9 GASTROESOPHAGEAL REFLUX DISEASE WITHOUT ESOPHAGITIS: Status: ACTIVE | Noted: 2024-11-11

## 2024-11-11 PROBLEM — E78.5 DYSLIPIDEMIA: Status: ACTIVE | Noted: 2024-11-11

## 2024-11-11 PROBLEM — D31.40: Status: ACTIVE | Noted: 2020-02-03

## 2024-11-11 PROBLEM — N91.5 OLIGOMENORRHEA: Status: ACTIVE | Noted: 2017-06-23

## 2024-11-11 PROBLEM — J35.01 CHRONIC TONSILLITIS: Status: ACTIVE | Noted: 2019-07-03

## 2024-11-11 PROBLEM — I10 ESSENTIAL HYPERTENSION: Status: ACTIVE | Noted: 2024-11-11

## 2024-11-11 PROBLEM — M99.04 SOMATIC DYSFUNCTION OF SACRAL REGION: Status: ACTIVE | Noted: 2024-04-25

## 2024-11-11 PROBLEM — R60.0 EDEMA OF LOWER EXTREMITY: Status: ACTIVE | Noted: 2024-11-11

## 2024-11-11 PROBLEM — T39.395A ADVERSE REACTION TO NON-STEROIDAL ANTI-INFLAMMATORY DRUG (NSAID): Status: ACTIVE | Noted: 2024-11-11

## 2024-11-11 PROBLEM — G47.33 OBSTRUCTIVE SLEEP APNEA SYNDROME: Status: ACTIVE | Noted: 2020-02-03

## 2024-11-11 PROBLEM — H52.7 DISORDER OF REFRACTION: Status: ACTIVE | Noted: 2024-11-11

## 2024-11-11 PROBLEM — E88.810 DYSMETABOLIC SYNDROME: Status: ACTIVE | Noted: 2021-05-06

## 2024-11-11 PROBLEM — E88.819 INSULIN RESISTANCE: Status: ACTIVE | Noted: 2024-11-11

## 2024-11-11 PROBLEM — A69.22 OTHER NEUROLOGIC DISORDERS IN LYME DISEASE: Status: ACTIVE | Noted: 2024-11-11

## 2024-11-11 PROBLEM — G47.19 EXCESSIVE DAYTIME SLEEPINESS: Status: ACTIVE | Noted: 2024-11-11

## 2024-11-11 PROBLEM — R42 DIZZINESS: Status: ACTIVE | Noted: 2022-12-29

## 2024-11-11 PROBLEM — N97.9 INFERTILITY, FEMALE, PRIMARY: Status: ACTIVE | Noted: 2024-11-11

## 2024-11-11 PROBLEM — E78.2 MIXED HYPERLIPIDEMIA: Status: ACTIVE | Noted: 2022-12-29

## 2024-11-11 PROBLEM — M54.17 LUMBOSACRAL RADICULOPATHY: Status: ACTIVE | Noted: 2024-10-17

## 2024-11-11 PROBLEM — M25.50 JOINT PAIN: Status: ACTIVE | Noted: 2024-11-11

## 2024-11-11 PROBLEM — E03.9 ACQUIRED HYPOTHYROIDISM: Status: ACTIVE | Noted: 2017-02-07

## 2024-11-11 PROBLEM — F33.1 MODERATE EPISODE OF RECURRENT MAJOR DEPRESSIVE DISORDER: Status: ACTIVE | Noted: 2024-11-11

## 2024-11-11 PROBLEM — N60.19 DIFFUSE CYSTIC MASTOPATHY: Status: ACTIVE | Noted: 2017-10-31

## 2024-11-11 PROBLEM — R07.89 ATYPICAL CHEST PAIN: Status: ACTIVE | Noted: 2024-11-11

## 2024-11-11 PROBLEM — R63.2 POLYPHAGIA: Status: ACTIVE | Noted: 2024-11-11

## 2024-11-11 PROBLEM — F33.41 RECURRENT MAJOR DEPRESSION IN PARTIAL REMISSION (CMS-HCC): Status: ACTIVE | Noted: 2024-07-17

## 2024-11-11 PROBLEM — E66.812 OBESITY, CLASS II, BMI 35-39.9: Status: ACTIVE | Noted: 2023-03-20

## 2024-11-11 PROBLEM — K64.4 ANAL SKIN TAG: Status: ACTIVE | Noted: 2024-11-11

## 2024-11-11 PROBLEM — M46.1 SACROILIITIS, NOT ELSEWHERE CLASSIFIED (CMS-HCC): Status: ACTIVE | Noted: 2024-10-17

## 2024-11-11 PROBLEM — F33.0 MILD EPISODE OF RECURRENT MAJOR DEPRESSIVE DISORDER (CMS-HCC): Status: ACTIVE | Noted: 2024-11-11

## 2024-11-11 PROBLEM — Z86.39 HISTORY OF METABOLIC DISORDER: Status: ACTIVE | Noted: 2024-11-11

## 2024-11-11 PROBLEM — L50.9 HIVES: Status: ACTIVE | Noted: 2024-11-11

## 2024-11-11 PROBLEM — G47.21 CIRCADIAN RHYTHM SLEEP DISORDER, DELAYED SLEEP PHASE TYPE: Status: ACTIVE | Noted: 2024-11-11

## 2024-11-11 PROBLEM — Z98.84 BARIATRIC SURGERY STATUS: Status: ACTIVE | Noted: 2024-11-11

## 2024-11-11 PROBLEM — E83.52 CALCIUM BLOOD INCREASED: Status: ACTIVE | Noted: 2023-05-05

## 2024-11-11 PROBLEM — E04.2 NON-TOXIC MULTINODULAR GOITER: Status: ACTIVE | Noted: 2019-05-02

## 2024-11-11 RX ORDER — OMEPRAZOLE 20 MG/1
20 CAPSULE, DELAYED RELEASE ORAL
COMMUNITY
Start: 2024-11-06 | End: 2025-02-04

## 2024-11-11 RX ORDER — VIT C/E/ZN/COPPR/LUTEIN/ZEAXAN 250MG-90MG
CAPSULE ORAL
COMMUNITY

## 2024-11-11 RX ORDER — KETOCONAZOLE 20 MG/ML
SHAMPOO, SUSPENSION TOPICAL
COMMUNITY
Start: 2024-09-05

## 2024-11-14 ENCOUNTER — TELEPHONE (OUTPATIENT)
Dept: SURGERY | Facility: CLINIC | Age: 39
End: 2024-11-14

## 2024-11-14 ENCOUNTER — OFFICE VISIT (OUTPATIENT)
Dept: SURGERY | Facility: CLINIC | Age: 39
End: 2024-11-14
Payer: COMMERCIAL

## 2024-11-14 ENCOUNTER — APPOINTMENT (OUTPATIENT)
Dept: PHYSICAL THERAPY | Facility: CLINIC | Age: 39
End: 2024-11-14
Payer: COMMERCIAL

## 2024-11-14 ENCOUNTER — APPOINTMENT (OUTPATIENT)
Dept: SURGERY | Facility: CLINIC | Age: 39
End: 2024-11-14
Payer: COMMERCIAL

## 2024-11-14 VITALS
WEIGHT: 239 LBS | HEART RATE: 72 BPM | BODY MASS INDEX: 37.51 KG/M2 | SYSTOLIC BLOOD PRESSURE: 140 MMHG | DIASTOLIC BLOOD PRESSURE: 84 MMHG | HEIGHT: 67 IN

## 2024-11-14 DIAGNOSIS — E78.5 DYSLIPIDEMIA: ICD-10-CM

## 2024-11-14 DIAGNOSIS — I10 ESSENTIAL HYPERTENSION: ICD-10-CM

## 2024-11-14 DIAGNOSIS — E66.812 OBESITY, CLASS II, BMI 35-39.9: Primary | ICD-10-CM

## 2024-11-14 DIAGNOSIS — G47.33 OBSTRUCTIVE SLEEP APNEA SYNDROME: ICD-10-CM

## 2024-11-14 DIAGNOSIS — Z71.3 ENCOUNTER FOR NUTRITION EVALUATION PRIOR TO BARIATRIC SURGERY: ICD-10-CM

## 2024-11-14 DIAGNOSIS — E28.2 POLYCYSTIC OVARY SYNDROME: ICD-10-CM

## 2024-11-14 DIAGNOSIS — Z98.84 BARIATRIC SURGERY STATUS: ICD-10-CM

## 2024-11-14 DIAGNOSIS — K21.00 GASTROESOPHAGEAL REFLUX DISEASE WITH ESOPHAGITIS WITHOUT HEMORRHAGE: ICD-10-CM

## 2024-11-14 DIAGNOSIS — E88.810 DYSMETABOLIC SYNDROME: ICD-10-CM

## 2024-11-14 DIAGNOSIS — E88.819 INSULIN RESISTANCE: ICD-10-CM

## 2024-11-14 DIAGNOSIS — E66.01 MORBID (SEVERE) OBESITY DUE TO EXCESS CALORIES (MULTI): ICD-10-CM

## 2024-11-14 DIAGNOSIS — Z13.21 ENCOUNTER FOR VITAMIN DEFICIENCY SCREENING: ICD-10-CM

## 2024-11-14 PROCEDURE — 3077F SYST BP >= 140 MM HG: CPT | Performed by: SURGERY

## 2024-11-14 PROCEDURE — 3008F BODY MASS INDEX DOCD: CPT | Performed by: SURGERY

## 2024-11-14 PROCEDURE — 99205 OFFICE O/P NEW HI 60 MIN: CPT | Performed by: SURGERY

## 2024-11-14 PROCEDURE — 99215 OFFICE O/P EST HI 40 MIN: CPT | Performed by: SURGERY

## 2024-11-14 PROCEDURE — 1036F TOBACCO NON-USER: CPT | Performed by: SURGERY

## 2024-11-14 PROCEDURE — 3079F DIAST BP 80-89 MM HG: CPT | Performed by: SURGERY

## 2024-11-14 NOTE — TELEPHONE ENCOUNTER
Received vm from patient stated that she has decided not to go through with surgery. Asked to cancel appt with Priya as well

## 2024-11-15 ENCOUNTER — TREATMENT (OUTPATIENT)
Dept: PHYSICAL THERAPY | Facility: CLINIC | Age: 39
End: 2024-11-15
Payer: COMMERCIAL

## 2024-11-15 DIAGNOSIS — R10.2 PELVIC PAIN: ICD-10-CM

## 2024-11-15 PROCEDURE — 97140 MANUAL THERAPY 1/> REGIONS: CPT | Mod: GP | Performed by: PHYSICAL THERAPIST

## 2024-11-15 PROCEDURE — 97110 THERAPEUTIC EXERCISES: CPT | Mod: GP | Performed by: PHYSICAL THERAPIST

## 2024-11-15 NOTE — PROGRESS NOTES
Physical Therapy Treatment    Patient Name: Deonna Saleem  MRN: [id number]  Encounter date:  11/15/2024  Time Calculation  Start Time: 0900  Stop Time: 1000  Time Calculation (min): 60 min     PT Therapeutic Procedures Time Entry  Manual Therapy Time Entry: 30  Therapeutic Exercise Time Entry: 25    Visit Number:  3 (including evaluation)  Planned total visits: 10 per POC  Visits Authorized/Insurance Coverage:  $4000 DED-MET, 80/20 COVERAGE, 60V PT/OT/ST, NO AUTH     Current Problem  Problem List Items Addressed This Visit             ICD-10-CM    Pelvic pain R10.2       Surgery  Hysterecomy with removal of tubers with repair of enterocele on 5/13/24     Precautions     POTS  Pain     10/10 coccyx pain not constant though;  today 6/10   Subjective  General        Feeling overall sore everywhere.  Patient reports coccyx pain is not constant any longer but still can get to 10/10.      Objective  Tender coccyx and tight tissues around right side of coccyx -- decline rectal examination.      Treatment:  Manual:  Verbal informed consent to rectal examination and treatment and coccyx treatment  STM and trigger point release to OI, coccygeus, ischiococcygeus bilateral   Distraction of coccyx and cranial glide of coccyx internal   External in sitting cranial glide of coccyx   Therapeutic exercise:    Educated on aquatics and benefits to core, stretching, strengthening and pain.  Discussed POTS and influence of heat of pool, air temp recommended patient bring electrolytes to drink during session.    Did not perform 11/15/24  Happy Baby with theraball red 3 diaphragmatic breathes x 3   LTR with theraball x 10  SKTC 3 diaphragmatic breathes x 3 bilateral   Hip IR/ER x 10   Piriformis stretch (cross over) x 3 diaphragmatic breathes x 3      Current HEP:  Access Code: [email or web address]  URL: [email or web address]  Date: 10/25/2024  Prepared by: Domenica Olson    Exercises  - Supine Posterior Pelvic Tilt  - 1 x daily - 7 x weekly  - 1 sets - 10 reps  - Supine Hip Internal and External Rotation  - 1 x daily - 7 x weekly - 1 sets - 10 reps  - Supine Pelvic Floor Stretch  - 1 x daily - 7 x weekly - 1 sets - 3 reps - 3-4 belly breathes hold    OP EDUCATION:     Pain reduction, coccyx mechanics, and updated HEP    Assessment:     Pt's response to treatment:  Patient felt 0/10 pain post session after coccyx mobilization and release of pelvic floor muscles rectally.  Patient is recommended to try pool therapy to reduce overall pain complaints (not just coccyx)  Pain end of session: 8/10    Plan:     Continue with current POC/no changes    Assessment of current progress against goals:  Progressing towards established goals    Goals:  Active       PT Pelvic Floor Goals       STGs - 5 sessions       Start:  08/28/24    Expected End:  10/17/24       1)  Patient will be knowledgeable with regards to downtraining techniques to improve relaxation of pelvic floor muscles  2)  Patient will report 25% less pain/symptoms during sitting  3)  Patient will perform diaphragmatic breathing properly to relax and contract pelvic floor muscle appropriately  4)  Patient will demonstrate good transverse abdominis activation to stabilize lumbopelvic girdle during ADLs          LTGs - 10 sessions       Start:  08/28/24    Expected End:  12/06/24       1)  Patient will report improved sleep with less waking to void (will only wake 1 time a night)   2)  Patient will increase tolerance to internal penetration for examination with min to nil pain  3)  Patient will reduce urine loss to nil post urination  4)  Patient will be able to return to prior level of function with pain of 2-3/10 in hip/buttock to sit;    5)  Patient will improve pelvic floor contraction to by 1/2-1 MMT  with coordinated exhale for improved continence          Patient Stated Goal 1       Start:  08/28/24    Expected End:  12/06/24       To reduce pain and increase mobility

## 2024-11-19 ENCOUNTER — APPOINTMENT (OUTPATIENT)
Dept: SURGERY | Facility: CLINIC | Age: 39
End: 2024-11-19
Payer: COMMERCIAL

## 2024-11-20 ENCOUNTER — APPOINTMENT (OUTPATIENT)
Dept: PHYSICAL THERAPY | Facility: CLINIC | Age: 39
End: 2024-11-20
Payer: COMMERCIAL

## 2024-11-21 ENCOUNTER — APPOINTMENT (OUTPATIENT)
Dept: PHYSICAL THERAPY | Facility: CLINIC | Age: 39
End: 2024-11-21
Payer: COMMERCIAL

## 2024-11-29 ENCOUNTER — APPOINTMENT (OUTPATIENT)
Dept: PHYSICAL THERAPY | Facility: CLINIC | Age: 39
End: 2024-11-29
Payer: COMMERCIAL

## 2024-11-29 ENCOUNTER — DOCUMENTATION (OUTPATIENT)
Dept: PHYSICAL THERAPY | Facility: CLINIC | Age: 39
End: 2024-11-29
Payer: COMMERCIAL

## 2024-11-29 NOTE — PROGRESS NOTES
Physical Therapy                 Therapy Communication Note    Patient Name: Deonna Saleem  MRN: [id number]  Today's Date: 11/29/2024     Discipline: Physical Therapy    Missed Visit Reason:  woke up with a migraine    Missed Time: Cancel

## 2024-12-05 ENCOUNTER — APPOINTMENT (OUTPATIENT)
Dept: PHYSICAL THERAPY | Facility: CLINIC | Age: 39
End: 2024-12-05
Payer: COMMERCIAL

## 2024-12-06 ENCOUNTER — APPOINTMENT (OUTPATIENT)
Dept: SURGERY | Facility: CLINIC | Age: 39
End: 2024-12-06
Payer: COMMERCIAL

## 2024-12-13 ENCOUNTER — APPOINTMENT (OUTPATIENT)
Dept: PHYSICAL THERAPY | Facility: CLINIC | Age: 39
End: 2024-12-13
Payer: COMMERCIAL

## 2024-12-17 ENCOUNTER — DOCUMENTATION (OUTPATIENT)
Dept: PHYSICAL THERAPY | Facility: CLINIC | Age: 39
End: 2024-12-17
Payer: COMMERCIAL

## 2024-12-17 NOTE — PROGRESS NOTES
Physical Therapy    Discharge Summary    Name: Deonna Saleem  MRN: [id number]  : 1985  Date: 24    Discharge Summary: PT    Discharge Information: Date of discharge 24, Date of last visit 11/15/24, Date of evaluation 24, and Number of attended visits 3    Therapy Summary: Poor attendance to therapy since new job.  Coccyx pain most concerning at 10/10 pain level-- responds well to manual treatment     Discharge Status: partially achieved goals      Rehab Discharge Reason: Failed to schedule and/or keep follow-up appointment(s)

## 2024-12-20 ENCOUNTER — APPOINTMENT (OUTPATIENT)
Dept: PHYSICAL THERAPY | Facility: CLINIC | Age: 39
End: 2024-12-20
Payer: COMMERCIAL

## 2025-04-23 ENCOUNTER — OFFICE VISIT (OUTPATIENT)
Dept: URGENT CARE | Age: 40
End: 2025-04-23
Payer: COMMERCIAL

## 2025-04-23 VITALS
SYSTOLIC BLOOD PRESSURE: 128 MMHG | HEART RATE: 93 BPM | DIASTOLIC BLOOD PRESSURE: 91 MMHG | BODY MASS INDEX: 39.15 KG/M2 | OXYGEN SATURATION: 100 % | TEMPERATURE: 98.1 F | HEIGHT: 65 IN | WEIGHT: 235 LBS

## 2025-04-23 DIAGNOSIS — L20.89 OTHER ATOPIC DERMATITIS: Primary | ICD-10-CM

## 2025-04-23 PROCEDURE — 99213 OFFICE O/P EST LOW 20 MIN: CPT | Performed by: NURSE PRACTITIONER

## 2025-04-23 PROCEDURE — 3080F DIAST BP >= 90 MM HG: CPT | Performed by: NURSE PRACTITIONER

## 2025-04-23 PROCEDURE — 3074F SYST BP LT 130 MM HG: CPT | Performed by: NURSE PRACTITIONER

## 2025-04-23 PROCEDURE — 3008F BODY MASS INDEX DOCD: CPT | Performed by: NURSE PRACTITIONER

## 2025-04-23 PROCEDURE — 1036F TOBACCO NON-USER: CPT | Performed by: NURSE PRACTITIONER

## 2025-04-23 RX ORDER — PREDNISONE 10 MG/1
TABLET ORAL
Qty: 30 TABLET | Refills: 0 | Status: SHIPPED | OUTPATIENT
Start: 2025-04-23 | End: 2025-05-03

## 2025-04-23 RX ORDER — PANTOPRAZOLE SODIUM 40 MG/1
40 TABLET, DELAYED RELEASE ORAL
COMMUNITY

## 2025-04-23 RX ORDER — LISDEXAMFETAMINE DIMESYLATE 20 MG/1
20 CAPSULE ORAL EVERY MORNING
COMMUNITY
Start: 2025-04-03

## 2025-04-23 RX ORDER — CLOMIPRAMINE HYDROCHLORIDE 50 MG/1
50 CAPSULE ORAL 2 TIMES DAILY
COMMUNITY
Start: 2025-03-05

## 2025-04-23 RX ORDER — TRIAMCINOLONE ACETONIDE 1 MG/G
OINTMENT TOPICAL 2 TIMES DAILY PRN
Qty: 15 G | Refills: 0 | Status: SHIPPED | OUTPATIENT
Start: 2025-04-23 | End: 2025-04-30

## 2025-04-23 ASSESSMENT — ENCOUNTER SYMPTOMS
NECK PAIN: 0
FEVER: 0
ABDOMINAL PAIN: 0
ARTHRALGIAS: 0
CHEST TIGHTNESS: 0
SHORTNESS OF BREATH: 0
HEADACHES: 0
SINUS PAIN: 0
DIZZINESS: 0
VOMITING: 0
FATIGUE: 0
DIARRHEA: 0
EYE PAIN: 0
BACK PAIN: 0
CONSTIPATION: 0
SORE THROAT: 0
COUGH: 0
WOUND: 0
NAUSEA: 0
PALPITATIONS: 0
CHILLS: 0
TROUBLE SWALLOWING: 0
APPETITE CHANGE: 0
MYALGIAS: 0
SINUS PRESSURE: 0
DIFFICULTY URINATING: 0
RHINORRHEA: 0

## 2025-04-23 ASSESSMENT — VISUAL ACUITY: OU: 1

## 2025-04-23 NOTE — PROGRESS NOTES
Subjective   Patient ID: Deonna Saleem is a 39 y.o. female. They present today with a chief complaint of Rash (Rash on neck/back of neck x 4 days).    History of Present Illness  Patient is a 38yo female who presents with a rash on her back and the back of her neck for 4 days.       Rash  Pertinent negatives include no congestion, cough, diarrhea, eye pain, fatigue, fever, rhinorrhea, shortness of breath, sore throat or vomiting.       Past Medical History  Allergies as of 04/23/2025 - Reviewed 04/23/2025   Allergen Reaction Noted    Gadolinium-containing contrast media Other and Hives 04/11/2022    Iodinated contrast media Hives 11/11/2024    Lamotrigine Rash 09/03/2024       Prescriptions Prior to Admission[1]     Medical History[2]    Surgical History[3]     reports that she has never smoked. She has never used smokeless tobacco. She reports that she does not currently use alcohol. She reports that she does not currently use drugs.    Review of Systems  Review of Systems   Constitutional:  Negative for appetite change, chills, fatigue and fever.   HENT:  Negative for congestion, dental problem, ear pain, hearing loss, postnasal drip, rhinorrhea, sinus pressure, sinus pain, sneezing, sore throat and trouble swallowing.    Eyes:  Negative for pain.   Respiratory:  Negative for cough, chest tightness and shortness of breath.    Cardiovascular:  Negative for chest pain and palpitations.   Gastrointestinal:  Negative for abdominal pain, constipation, diarrhea, nausea and vomiting.   Genitourinary:  Negative for difficulty urinating.   Musculoskeletal:  Negative for arthralgias, back pain, gait problem, myalgias and neck pain.   Skin:  Positive for rash. Negative for wound.   Neurological:  Negative for dizziness and headaches.   Psychiatric/Behavioral:  Negative for self-injury and suicidal ideas.                                   Objective    Vitals:    04/23/25 1612   BP: (!) 128/91   BP Location: Left arm   Patient  "Position: Sitting   Pulse: 93   Temp: 36.7 °C (98.1 °F)   TempSrc: Oral   SpO2: 100%   Weight: 107 kg (235 lb)   Height: 1.651 m (5' 5\")     No LMP recorded. Patient has had a hysterectomy.    Physical Exam  Vitals reviewed.   Constitutional:       General: She is awake. She is not in acute distress.     Appearance: Normal appearance. She is well-developed, well-groomed and normal weight. She is not ill-appearing.   HENT:      Head: Normocephalic and atraumatic.      Right Ear: Hearing and external ear normal.      Left Ear: Hearing and external ear normal.      Nose: Nose normal. No nasal deformity or signs of injury.      Mouth/Throat:      Lips: Pink.   Eyes:      General: Lids are normal. Vision grossly intact.   Cardiovascular:      Rate and Rhythm: Normal rate and regular rhythm.      Heart sounds: Normal heart sounds, S1 normal and S2 normal. Heart sounds not distant. No murmur heard.  Pulmonary:      Effort: Pulmonary effort is normal. No tachypnea, bradypnea, accessory muscle usage, prolonged expiration, respiratory distress or retractions.      Breath sounds: Normal breath sounds and air entry. No stridor, decreased air movement or transmitted upper airway sounds. No decreased breath sounds.   Chest:      Chest wall: No deformity.   Skin:     General: Skin is warm and dry.      Capillary Refill: Capillary refill takes less than 2 seconds.      Findings: Erythema and rash present. Rash is macular and papular.             Comments: Erythematous, no drainage, macular/papular   Neurological:      General: No focal deficit present.      Mental Status: She is alert.      Gait: Gait is intact.   Psychiatric:         Attention and Perception: Attention and perception normal.         Mood and Affect: Mood and affect normal.         Speech: Speech normal.         Behavior: Behavior normal. Behavior is cooperative.         Procedures    Point of Care Test & Imaging Results from this visit  No results found for this " visit on 04/23/25.   Imaging  No results found.    Cardiology, Vascular, and Other Imaging  No other imaging results found for the past 2 days      Diagnostic study results (if any) were reviewed by AIDAN Ricardo.    Assessment/Plan   Allergies, medications, history, and pertinent labs/EKGs/Imaging reviewed by AIDAN Ricardo.     Medical Decision Making  Physical exam consistent with atopic dermatitis. Will treat with tapered dose of steroids. Patient to follow up with PCP or derm if symptoms persist after treatment.     Risks, benefits, and alternatives of the medications and treatment plan prescribed today were discussed, and patient expressed understanding. Plan follow up as discussed or as needed if any worsening symptoms or change in condition. Reinforced red flags including (but not limited to): severe or worsening abdominal pain; difficulty swallowing; stiff neck; shortness of breath; coughing or vomiting blood; chest pain; and new or increased fever are indications to go to the Emergency Department.    The patient voices understanding of all medications. No barriers to adherence. Patient is taking all medications as prescribed and tolerating well. For any new medications, the patient was instructed of directions for and consequences of not taking medication and they were informed about the potential side effects and drug interactions. The after-visit summary was given to the patient and care instructions were reviewed with the patient. All questions were answered and the patient verbalized understanding of the plan of care for today.    Orders and Diagnoses  Diagnoses and all orders for this visit:  Other atopic dermatitis  -     triamcinolone (Kenalog) 0.1 % ointment; Apply topically 2 times a day as needed for irritation or rash for up to 7 days.  -     predniSONE (Deltasone) 10 mg tablet; Take 5 tablets (50 mg) by mouth once daily in the morning for 2 days, THEN 4  tablets (40 mg) once daily in the morning for 2 days, THEN 3 tablets (30 mg) once daily in the morning for 2 days, THEN 2 tablets (20 mg) once daily in the morning for 2 days, THEN 1 tablet (10 mg) once daily in the morning for 2 days.      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    Localized edema     Edema of leg    Williamson syndrome     Other specified postprocedural states 12/06/2019    Status post LASIK surgery    Other vitreous opacities, unspecified eye 12/06/2019    Vitreous floaters    PCOS (polycystic ovarian syndrome)     with insulin resistance    Personal history of other diseases of the nervous system and sense organs     History of sleep apnea    Personal history of other endocrine, nutritional and metabolic disease     History of obesity    Personal history of other endocrine, nutritional and metabolic disease     History of high cholesterol    Personal history of other endocrine, nutritional and metabolic disease     History of Hashimoto thyroiditis    Personal history of other mental and behavioral disorders     History of panic attacks    Personal history of other mental and behavioral disorders 11/17/2013    History of depression    Personal history of other mental and behavioral disorders     History of depression    Personal history of other specified conditions 08/08/2019    History of palpitations    Personal history of urinary calculi     History of kidney stones    Unspecified disorder of refraction 12/06/2019    Refractive error   [3]   Past Surgical History:  Procedure Laterality Date    CARPAL TUNNEL RELEASE      HYSTERECTOMY  05/10/2024    OTHER SURGICAL HISTORY  11/21/2014    Corneal LASIK  Bilateral    OTHER SURGICAL HISTORY  11/21/2014    Wrist Surgery